# Patient Record
Sex: MALE | Race: WHITE | Employment: OTHER | URBAN - NONMETROPOLITAN AREA
[De-identification: names, ages, dates, MRNs, and addresses within clinical notes are randomized per-mention and may not be internally consistent; named-entity substitution may affect disease eponyms.]

---

## 2017-10-30 RX ORDER — MELOXICAM 7.5 MG/1
7.5 TABLET ORAL DAILY
COMMUNITY
End: 2017-10-31 | Stop reason: SDUPTHER

## 2017-10-31 ENCOUNTER — OFFICE VISIT (OUTPATIENT)
Dept: RHEUMATOLOGY | Age: 52
End: 2017-10-31
Payer: COMMERCIAL

## 2017-10-31 VITALS
HEIGHT: 67 IN | DIASTOLIC BLOOD PRESSURE: 87 MMHG | BODY MASS INDEX: 24.52 KG/M2 | HEART RATE: 99 BPM | RESPIRATION RATE: 16 BRPM | WEIGHT: 156.2 LBS | SYSTOLIC BLOOD PRESSURE: 177 MMHG

## 2017-10-31 DIAGNOSIS — R20.2 PARESTHESIA: ICD-10-CM

## 2017-10-31 DIAGNOSIS — Z51.81 MEDICATION MONITORING ENCOUNTER: ICD-10-CM

## 2017-10-31 DIAGNOSIS — M25.50 POLYARTHRALGIA: Primary | ICD-10-CM

## 2017-10-31 DIAGNOSIS — Z87.39 H/O RHEUMATOID ARTHRITIS: ICD-10-CM

## 2017-10-31 PROCEDURE — 99244 OFF/OP CNSLTJ NEW/EST MOD 40: CPT | Performed by: INTERNAL MEDICINE

## 2017-10-31 PROCEDURE — G8484 FLU IMMUNIZE NO ADMIN: HCPCS | Performed by: INTERNAL MEDICINE

## 2017-10-31 PROCEDURE — G8427 DOCREV CUR MEDS BY ELIG CLIN: HCPCS | Performed by: INTERNAL MEDICINE

## 2017-10-31 PROCEDURE — G8420 CALC BMI NORM PARAMETERS: HCPCS | Performed by: INTERNAL MEDICINE

## 2017-10-31 PROCEDURE — 3017F COLORECTAL CA SCREEN DOC REV: CPT | Performed by: INTERNAL MEDICINE

## 2017-10-31 RX ORDER — MELOXICAM 7.5 MG/1
7.5 TABLET ORAL DAILY
Qty: 30 TABLET | Refills: 2 | Status: SHIPPED | OUTPATIENT
Start: 2017-10-31 | End: 2017-12-04 | Stop reason: SDUPTHER

## 2017-10-31 ASSESSMENT — JOINT PAIN
TOTAL NUMBER OF TENDER JOINTS: 16
TOTAL NUMBER OF SWOLLEN JOINTS: 16

## 2017-10-31 ASSESSMENT — ENCOUNTER SYMPTOMS
GASTROINTESTINAL NEGATIVE: 1
BACK PAIN: 0
ORTHOPNEA: 1
EYES NEGATIVE: 1

## 2017-10-31 NOTE — Clinical Note
Please inform the patient that the hepatitis test were negative. The x-rays revealed some osteaorthitis of the fingers. The blood testing were consistent with rheumatoid arthritis. I have sent the prescription for methotrexate 15mg (six tablets) once weekly by mouth and folic acid 1mg daily by mouth  to the pharmacy. He will need to have blood tests done in 3-4 weeks after starting the new medications to watch for possible side effects.

## 2017-10-31 NOTE — PROGRESS NOTES
arthritis. Cancer screening: not up to date. No c-scope in the past.  Travel:denies   Exposure(s): denies     ROS:  Review of Systems   Constitutional: Positive for malaise/fatigue (intermittent). HENT: Negative. Eyes: Negative. Cardiovascular: Positive for orthopnea (improved w/ laying on side). Gastrointestinal: Negative. Genitourinary: Negative. Musculoskeletal: Negative for back pain, myalgias and neck pain. Skin: Negative. Neurological: Positive for tingling. Endo/Heme/Allergies: Bruises/bleeds easily. Psychiatric/Behavioral: Negative for depression, substance abuse and suicidal ideas. The patient has insomnia. PAST MEDICAL HISTORY  Past Medical History:   Diagnosis Date    Rheumatoid arthritis ([facility] [address]        SOCIAL HISTORY  Social History     Social History    Marital status: Single     Spouse name: N/A    Number of children: N/A    Years of education: N/A     Social History Main Topics    Smoking status: Not on file    Smokeless tobacco: Not on file    Alcohol use Not on file    Drug use: Unknown    Sexual activity: Not on file     Other Topics Concern    Not on file     Social History Narrative    No narrative on file       FAMILY HISTORY  No family history on file. SURGICAL HISTORY  Past Surgical History:   Procedure Laterality Date    ROTATOR CUFF REPAIR Right     shoulder       ALLERGIES  Allergies not on file    CURRENT MEDICATIONS  Current Outpatient Prescriptions   Medication Sig Dispense Refill    meloxicam (MOBIC) 7.5 MG tablet Take 7.5 mg by mouth daily       No current facility-administered medications for this visit. Objective:  BP (!) 177/87 (Site: Right Arm, Position: Sitting, Cuff Size: Medium Adult)   Pulse 99   Resp 16   Ht 5' 7\" (1.702 m)   Wt 156 lb 3.2 oz (70.9 kg)   BMI 24.46 kg/m²     General: No distress. Alert. Eyes: P ERRL. No sclera icterus. No conjunctival injection. ENT: No discharge. Pharynx clear.   Neck: Trachea Return in about 2 months (around 12/31/2017). Electronically signed by Amee Steen DO on 10/31/2017 at 10:47 AM      Thank you for allowing me to participate in the care of this patient. Please call if there are any questions. .    Addendum: 11/3/17:   - cmp mild elevation of t.bili, normal AST, ALT, Creatinine  - normal WBC, H/H, Plt.   - normal Z11 and folic acid   - (+) RF.   - XR chest with changes consistent with COPD  - XR bilaterla hand: soft tissue swelling, pieraticularly on the right at the IPP joints. 2. Arthritic changes of themetacarpophalgeal joints bilaterally and scattered trhough the DIP and IP joints. No definitive erosive changes are seen. 3. The swelling of the PIP joints can be an early sign of rheumatoid arthritis. - awaiting hepatitis panel prior to starting methotrexate or other DMARD       ADDNEDUM 11/7/17  XR cervical spine: 11/2/17 straightening consistent with muscle spasm noted. Addendum: 11/8/17:   - negative Hepatitis panel,   - (+) RF: IgG, IgM > 100, IgA > 100  - MAGDALENA w/ IFA: negative. - will start methotrexate 15mg p.o weekly and folic acid 1mg daily as discussed with patient during visit. - will need Cbc, CMP, ESR, CRP every 4 weeks with methotrexate initiation and titration.

## 2017-11-02 LAB
ALBUMIN SERPL-MCNC: 4.5 G/DL
ALP BLD-CCNC: 96 U/L
ALT SERPL-CCNC: 11 U/L
ANION GAP SERPL CALCULATED.3IONS-SCNC: 17 MMOL/L
AST SERPL-CCNC: 13 U/L
BASOPHILS ABSOLUTE: 100 /ΜL
BASOPHILS RELATIVE PERCENT: 0.8 %
BILIRUB SERPL-MCNC: 1.3 MG/DL (ref 0.1–1.4)
BUN BLDV-MCNC: 10 MG/DL
CALCIUM SERPL-MCNC: 9.2 MG/DL
CHLORIDE BLD-SCNC: 99 MMOL/L
CO2: 28 MMOL/L
CREAT SERPL-MCNC: 0.77 MG/DL
EOSINOPHILS ABSOLUTE: 100 /ΜL
EOSINOPHILS RELATIVE PERCENT: 1.7 %
FOLATE: 9.4
GFR CALCULATED: 112
GLUCOSE BLD-MCNC: 106 MG/DL
HCT VFR BLD CALC: 44.8 % (ref 41–53)
HEMOGLOBIN: 15.1 G/DL (ref 13.5–17.5)
LYMPHOCYTES ABSOLUTE: 1300 /ΜL
LYMPHOCYTES RELATIVE PERCENT: 20.7 %
MCH RBC QN AUTO: 34.1 PG
MCHC RBC AUTO-ENTMCNC: 33.6 G/DL
MCV RBC AUTO: 101.5 FL
MONOCYTES ABSOLUTE: 500 /ΜL
MONOCYTES RELATIVE PERCENT: 7.9 %
NEUTROPHILS ABSOLUTE: 4200 /ΜL
NEUTROPHILS RELATIVE PERCENT: 68.9 %
PDW BLD-RTO: 12.6 %
PLATELET # BLD: 249 K/ΜL
PMV BLD AUTO: NORMAL FL
POTASSIUM SERPL-SCNC: 4.6 MMOL/L
RBC # BLD: 4.42 10^6/ΜL
RHEUMATOID FACTOR: >10
SODIUM BLD-SCNC: 139 MMOL/L
TOTAL PROTEIN: 8
URIC ACID: 5.1
VITAMIN B-12: 366
WBC # BLD: 6.2 10^3/ML

## 2017-11-08 RX ORDER — FOLIC ACID 1 MG/1
1 TABLET ORAL DAILY
Qty: 30 TABLET | Refills: 3 | Status: SHIPPED | OUTPATIENT
Start: 2017-11-08 | End: 2017-12-12 | Stop reason: SDUPTHER

## 2017-11-09 ENCOUNTER — TELEPHONE (OUTPATIENT)
Dept: RHEUMATOLOGY | Age: 52
End: 2017-11-09

## 2017-11-09 NOTE — TELEPHONE ENCOUNTER
----- Message from Ramo Salazar DO sent at 11/8/2017  8:44 PM EST -----  Please inform the patient that the hepatitis test were negative. The x-rays revealed some osteaorthitis of the fingers. The blood testing were consistent with rheumatoid arthritis. I have sent the prescription for methotrexate 15mg (six tablets) once weekly by mouth and folic acid 1mg daily by mouth  to the pharmacy. He will need to have blood tests done in 3-4 weeks after starting the new medications to watch for possible side effects.

## 2017-12-04 RX ORDER — MELOXICAM 7.5 MG/1
7.5 TABLET ORAL DAILY
Qty: 30 TABLET | Refills: 2 | Status: SHIPPED | OUTPATIENT
Start: 2017-12-04 | End: 2018-03-05 | Stop reason: SDUPTHER

## 2017-12-04 RX ORDER — FOLIC ACID 1 MG/1
1 TABLET ORAL DAILY
Qty: 30 TABLET | Refills: 3 | Status: CANCELLED | OUTPATIENT
Start: 2017-12-04

## 2017-12-07 LAB
ALBUMIN SERPL-MCNC: 4.3 G/DL
ALP BLD-CCNC: 89 U/L
ALT SERPL-CCNC: 32 U/L
ANION GAP SERPL CALCULATED.3IONS-SCNC: 1.6 MMOL/L
AST SERPL-CCNC: 24 U/L
BASOPHILS ABSOLUTE: 0 /ΜL
BASOPHILS RELATIVE PERCENT: 0.4 %
BILIRUB SERPL-MCNC: 1.3 MG/DL (ref 0.1–1.4)
BUN BLDV-MCNC: 11 MG/DL
CALCIUM SERPL-MCNC: 8.9 MG/DL
CHLORIDE BLD-SCNC: 103 MMOL/L
CO2: 26 MMOL/L
CREAT SERPL-MCNC: 0.76 MG/DL
EOSINOPHILS ABSOLUTE: 100 /ΜL
EOSINOPHILS RELATIVE PERCENT: 2 %
GFR CALCULATED: 114
GLUCOSE BLD-MCNC: 110 MG/DL
HCT VFR BLD CALC: 42.2 % (ref 41–53)
HEMOGLOBIN: 14.5 G/DL (ref 13.5–17.5)
LYMPHOCYTES ABSOLUTE: 1100 /ΜL
LYMPHOCYTES RELATIVE PERCENT: 18.9 %
MCH RBC QN AUTO: 37.4 PG
MCHC RBC AUTO-ENTMCNC: 34.4 G/DL
MCV RBC AUTO: 108.8 FL
MONOCYTES ABSOLUTE: 400 /ΜL
MONOCYTES RELATIVE PERCENT: 7.4 %
NEUTROPHILS ABSOLUTE: 4200 /ΜL
NEUTROPHILS RELATIVE PERCENT: 71.3 %
PDW BLD-RTO: 16.9 %
PLATELET # BLD: 253 K/ΜL
PMV BLD AUTO: NORMAL FL
POTASSIUM SERPL-SCNC: 4.6 MMOL/L
RBC # BLD: 3.88 10^6/ΜL
SODIUM BLD-SCNC: 140 MMOL/L
TOTAL PROTEIN: 7
WBC # BLD: 5.9 10^3/ML

## 2017-12-12 ENCOUNTER — TELEPHONE (OUTPATIENT)
Dept: RHEUMATOLOGY | Age: 52
End: 2017-12-12

## 2017-12-12 RX ORDER — FOLIC ACID 1 MG/1
1 TABLET ORAL DAILY
Qty: 30 TABLET | Refills: 3 | Status: SHIPPED | OUTPATIENT
Start: 2017-12-12 | End: 2018-03-05 | Stop reason: SDUPTHER

## 2017-12-14 NOTE — TELEPHONE ENCOUNTER
Pt notified of providers recommendations and verbalized understanding.  Pt states methotrexate is helping

## 2018-01-22 ENCOUNTER — OFFICE VISIT (OUTPATIENT)
Dept: RHEUMATOLOGY | Age: 53
End: 2018-01-22
Payer: COMMERCIAL

## 2018-01-22 VITALS
RESPIRATION RATE: 16 BRPM | WEIGHT: 160.8 LBS | HEIGHT: 67 IN | HEART RATE: 97 BPM | BODY MASS INDEX: 25.24 KG/M2 | SYSTOLIC BLOOD PRESSURE: 152 MMHG | DIASTOLIC BLOOD PRESSURE: 91 MMHG

## 2018-01-22 DIAGNOSIS — M05.79 RHEUMATOID ARTHRITIS INVOLVING MULTIPLE SITES WITH POSITIVE RHEUMATOID FACTOR (HCC): Primary | ICD-10-CM

## 2018-01-22 DIAGNOSIS — F17.219 CIGARETTE NICOTINE DEPENDENCE WITH NICOTINE-INDUCED DISORDER: ICD-10-CM

## 2018-01-22 DIAGNOSIS — R20.2 PARESTHESIA: ICD-10-CM

## 2018-01-22 DIAGNOSIS — Z51.81 MEDICATION MONITORING ENCOUNTER: ICD-10-CM

## 2018-01-22 PROCEDURE — G8484 FLU IMMUNIZE NO ADMIN: HCPCS | Performed by: INTERNAL MEDICINE

## 2018-01-22 PROCEDURE — G8419 CALC BMI OUT NRM PARAM NOF/U: HCPCS | Performed by: INTERNAL MEDICINE

## 2018-01-22 PROCEDURE — G8427 DOCREV CUR MEDS BY ELIG CLIN: HCPCS | Performed by: INTERNAL MEDICINE

## 2018-01-22 PROCEDURE — 99406 BEHAV CHNG SMOKING 3-10 MIN: CPT | Performed by: INTERNAL MEDICINE

## 2018-01-22 PROCEDURE — 4004F PT TOBACCO SCREEN RCVD TLK: CPT | Performed by: INTERNAL MEDICINE

## 2018-01-22 PROCEDURE — 99214 OFFICE O/P EST MOD 30 MIN: CPT | Performed by: INTERNAL MEDICINE

## 2018-01-22 PROCEDURE — 3017F COLORECTAL CA SCREEN DOC REV: CPT | Performed by: INTERNAL MEDICINE

## 2018-01-22 RX ORDER — NICOTINE 21 MG/24HR
1 PATCH, TRANSDERMAL 24 HOURS TRANSDERMAL EVERY 24 HOURS
Qty: 30 PATCH | Refills: 3 | Status: SHIPPED | OUTPATIENT
Start: 2018-01-22 | End: 2019-05-14 | Stop reason: SDUPTHER

## 2018-01-22 ASSESSMENT — ENCOUNTER SYMPTOMS
BACK PAIN: 0
GASTROINTESTINAL NEGATIVE: 1
ORTHOPNEA: 1
EYES NEGATIVE: 1

## 2018-01-22 ASSESSMENT — JOINT PAIN
TOTAL NUMBER OF SWOLLEN JOINTS: 6
TOTAL NUMBER OF TENDER JOINTS: 16

## 2018-01-26 ENCOUNTER — TELEPHONE (OUTPATIENT)
Dept: RHEUMATOLOGY | Age: 53
End: 2018-01-26

## 2018-03-05 RX ORDER — FOLIC ACID 1 MG/1
1 TABLET ORAL DAILY
Qty: 30 TABLET | Refills: 3 | Status: SHIPPED | OUTPATIENT
Start: 2018-03-05 | End: 2018-03-05 | Stop reason: SDUPTHER

## 2018-03-05 RX ORDER — FOLIC ACID 1 MG/1
1 TABLET ORAL DAILY
Qty: 90 TABLET | Refills: 3 | Status: SHIPPED | OUTPATIENT
Start: 2018-03-05 | End: 2018-03-29 | Stop reason: SDUPTHER

## 2018-03-05 RX ORDER — MELOXICAM 7.5 MG/1
7.5 TABLET ORAL DAILY
Qty: 30 TABLET | Refills: 2 | Status: SHIPPED | OUTPATIENT
Start: 2018-03-05 | End: 2018-07-26 | Stop reason: SDUPTHER

## 2018-03-06 ENCOUNTER — TELEPHONE (OUTPATIENT)
Dept: RHEUMATOLOGY | Age: 53
End: 2018-03-06

## 2018-03-26 ENCOUNTER — OFFICE VISIT (OUTPATIENT)
Dept: RHEUMATOLOGY | Age: 53
End: 2018-03-26
Payer: COMMERCIAL

## 2018-03-26 VITALS
WEIGHT: 163.4 LBS | SYSTOLIC BLOOD PRESSURE: 155 MMHG | HEIGHT: 67 IN | RESPIRATION RATE: 18 BRPM | BODY MASS INDEX: 25.65 KG/M2 | HEART RATE: 97 BPM | DIASTOLIC BLOOD PRESSURE: 88 MMHG

## 2018-03-26 DIAGNOSIS — M05.79 RHEUMATOID ARTHRITIS INVOLVING MULTIPLE SITES WITH POSITIVE RHEUMATOID FACTOR (HCC): Primary | ICD-10-CM

## 2018-03-26 DIAGNOSIS — Z51.81 MEDICATION MONITORING ENCOUNTER: ICD-10-CM

## 2018-03-26 DIAGNOSIS — R20.2 PARESTHESIA: ICD-10-CM

## 2018-03-26 DIAGNOSIS — F17.219 CIGARETTE NICOTINE DEPENDENCE WITH NICOTINE-INDUCED DISORDER: ICD-10-CM

## 2018-03-26 DIAGNOSIS — M06.30 RHEUMATOID NODULOSIS (HCC): ICD-10-CM

## 2018-03-26 PROCEDURE — 99214 OFFICE O/P EST MOD 30 MIN: CPT | Performed by: INTERNAL MEDICINE

## 2018-03-26 PROCEDURE — G8484 FLU IMMUNIZE NO ADMIN: HCPCS | Performed by: INTERNAL MEDICINE

## 2018-03-26 PROCEDURE — G8419 CALC BMI OUT NRM PARAM NOF/U: HCPCS | Performed by: INTERNAL MEDICINE

## 2018-03-26 PROCEDURE — 4004F PT TOBACCO SCREEN RCVD TLK: CPT | Performed by: INTERNAL MEDICINE

## 2018-03-26 PROCEDURE — G8427 DOCREV CUR MEDS BY ELIG CLIN: HCPCS | Performed by: INTERNAL MEDICINE

## 2018-03-26 PROCEDURE — 3017F COLORECTAL CA SCREEN DOC REV: CPT | Performed by: INTERNAL MEDICINE

## 2018-03-26 ASSESSMENT — ENCOUNTER SYMPTOMS
BACK PAIN: 0
ORTHOPNEA: 1
EYES NEGATIVE: 1
GASTROINTESTINAL NEGATIVE: 1

## 2018-03-26 ASSESSMENT — JOINT PAIN
TOTAL NUMBER OF SWOLLEN JOINTS: 7
TOTAL NUMBER OF TENDER JOINTS: 8

## 2018-03-26 NOTE — PROGRESS NOTES
Smoking status: Current Every Day Smoker     Packs/day: 1.00     Years: 35.00     Types: Cigarettes    Smokeless tobacco: Never Used    Alcohol use No    Drug use: No    Sexual activity: Not Asked     Other Topics Concern    None     Social History Narrative    None       FAMILY HISTORY  Family History   Problem Relation Age of Onset    High Blood Pressure Mother     Kidney Disease Father        SURGICAL HISTORY  Past Surgical History:   Procedure Laterality Date    ROTATOR CUFF REPAIR Right     shoulder       ALLERGIES  No Known Allergies    CURRENT MEDICATIONS  Current Outpatient Prescriptions   Medication Sig Dispense Refill    meloxicam (MOBIC) 7.5 MG tablet Take 1 tablet by mouth daily 30 tablet 2    folic acid (FOLVITE) 1 MG tablet Take 1 tablet by mouth daily 90 tablet 3    methotrexate (RHEUMATREX) 2.5 MG chemo tablet Take 10 tablets by mouth every 7 days Take 5 tablets in the morning and 5 tablets in the evening 40 tablet 0    nicotine (NICODERM CQ) 21 MG/24HR Place 1 patch onto the skin every 24 hours 30 patch 3     No current facility-administered medications for this visit. Objective:  BP (!) 155/88   Pulse 97   Resp 18   Ht 5' 7.01\" (1.702 m)   Wt 163 lb 6.4 oz (74.1 kg)   BMI 25.59 kg/m²     General: No distress. Alert. Eyes: P ERRL. No sclera icterus. No conjunctival injection. ENT: No discharge. Pharynx clear. Neck: Trachea midline. Normal thyroid. Resp: No accessory muscle use. No crackles. No wheezing. No rhonchi. No dullness on percussion. CV: Regular rate. Regular rhythm. No mumur or rub. No edema. M/S:   Upper extremities:  Muscle strength 5/5, FROM, No Synovitis   Shoulder: intact ROM. Negative bilateral hawking, bhavna, speed, infraspinatus, scarf. Elbows: no swelling. Nodules along the olecranon process, swelling of the Left olecranon, nodules along the mid-forearm  Wrist: non-tender, no synovitis.    Fingers: synovitis/swelling/tender  left MCP 2-4, left

## 2018-03-29 ENCOUNTER — TELEPHONE (OUTPATIENT)
Dept: RHEUMATOLOGY | Age: 53
End: 2018-03-29

## 2018-03-29 RX ORDER — FOLIC ACID 1 MG/1
1 TABLET ORAL DAILY
Qty: 90 TABLET | Refills: 3 | Status: SHIPPED | OUTPATIENT
Start: 2018-03-29 | End: 2018-07-24 | Stop reason: SDUPTHER

## 2018-03-29 NOTE — TELEPHONE ENCOUNTER
----- Message from Navjot Sanders DO sent at 3/28/2018 12:33 PM EDT -----  The blood testing revealed normal inflammatory markers (Erythrocyte Sedimentation Rate (ESR) and C-reactive protein). His white blood cell count was found to be mildly elevated on the recent evaluation.

## 2018-05-04 ENCOUNTER — TELEPHONE (OUTPATIENT)
Dept: RHEUMATOLOGY | Age: 53
End: 2018-05-04

## 2018-05-09 ENCOUNTER — TELEPHONE (OUTPATIENT)
Dept: RHEUMATOLOGY | Age: 53
End: 2018-05-09

## 2018-05-10 ENCOUNTER — TELEPHONE (OUTPATIENT)
Dept: RHEUMATOLOGY | Age: 53
End: 2018-05-10

## 2018-05-10 LAB
A/G RATIO: 1.9 (ref 1.5–2.5)
A/G RATIO: 2.1 (ref 1.5–2.5)
A/G RATIO: 2.2 (ref 1.5–2.5)
ABSOLUTE BASO #: 0 /CMM (ref 0–200)
ABSOLUTE BASO #: 0 /CMM (ref 0–200)
ABSOLUTE BASO #: 100 /CMM (ref 0–200)
ABSOLUTE EOS #: 100 /CMM (ref 0–500)
ABSOLUTE EOS #: 200 /CMM (ref 0–500)
ABSOLUTE EOS #: 200 /CMM (ref 0–500)
ABSOLUTE LYMPH #: 1300 /CMM (ref 1000–4800)
ABSOLUTE LYMPH #: 1300 /CMM (ref 1000–4800)
ABSOLUTE LYMPH #: 1700 /CMM (ref 1000–4800)
ABSOLUTE MONO #: 500 /CMM (ref 0–800)
ABSOLUTE MONO #: 500 /CMM (ref 0–800)
ABSOLUTE MONO #: 700 /CMM (ref 0–800)
ABSOLUTE NEUT #: 4400 /CMM (ref 1800–7700)
ABSOLUTE NEUT #: 4500 /CMM (ref 1800–7700)
ABSOLUTE NEUT #: 9900 /CMM (ref 1800–7700)
ALBUMIN SERPL-MCNC: 4.5 GM/DL (ref 3.5–5)
ALBUMIN SERPL-MCNC: 4.6 GM/DL (ref 3.5–5)
ALBUMIN SERPL-MCNC: 4.8 GM/DL (ref 3.5–5)
ALP BLD-CCNC: 81 IU/L (ref 41–137)
ALP BLD-CCNC: 84 IU/L (ref 41–137)
ALP BLD-CCNC: 92 IU/L (ref 41–137)
ALT SERPL-CCNC: 15 IU/L (ref 10–40)
ALT SERPL-CCNC: 19 IU/L (ref 10–40)
ALT SERPL-CCNC: 28 IU/L (ref 10–40)
ANION GAP SERPL CALCULATED.3IONS-SCNC: 4 MMOL/L (ref 4–12)
ANION GAP SERPL CALCULATED.3IONS-SCNC: 4 MMOL/L (ref 4–12)
ANION GAP SERPL CALCULATED.3IONS-SCNC: 6 MMOL/L (ref 4–12)
AST SERPL-CCNC: 16 IU/L (ref 15–41)
AST SERPL-CCNC: 17 IU/L (ref 15–41)
AST SERPL-CCNC: 18 IU/L (ref 15–41)
BASOPHILS RELATIVE PERCENT: 0.4 % (ref 0–2)
BASOPHILS RELATIVE PERCENT: 0.7 % (ref 0–2)
BASOPHILS RELATIVE PERCENT: 0.9 % (ref 0–2)
BILIRUB SERPL-MCNC: 1.3 MG/DL (ref 0.2–1)
BILIRUB SERPL-MCNC: 1.5 MG/DL (ref 0.2–1)
BILIRUB SERPL-MCNC: 1.7 MG/DL (ref 0.2–1)
BUN BLDV-MCNC: 15 MG/DL (ref 7–20)
BUN BLDV-MCNC: 8 MG/DL (ref 7–20)
BUN BLDV-MCNC: 9 MG/DL (ref 7–20)
C-REACTIVE PROTEIN: 0.6 MG/DL
C-REACTIVE PROTEIN: 0.7 MG/DL
C-REACTIVE PROTEIN: 0.9 MG/DL
CALCIUM SERPL-MCNC: 9 MG/DL (ref 8.8–10.5)
CALCIUM SERPL-MCNC: 9.1 MG/DL (ref 8.8–10.5)
CALCIUM SERPL-MCNC: 9.2 MG/DL (ref 8.8–10.5)
CHLORIDE BLD-SCNC: 107 MEQ/L (ref 101–111)
CHLORIDE BLD-SCNC: 108 MEQ/L (ref 101–111)
CHLORIDE BLD-SCNC: 108 MEQ/L (ref 101–111)
CO2: 25 MEQ/L (ref 21–32)
CO2: 26 MEQ/L (ref 21–32)
CO2: 27 MEQ/L (ref 21–32)
CREAT SERPL-MCNC: 0.85 MG/DL (ref 0.7–1.3)
CREAT SERPL-MCNC: 0.93 MG/DL (ref 0.7–1.3)
CREAT SERPL-MCNC: 0.94 MG/DL (ref 0.7–1.3)
CREATININE CLEARANCE: >60
EOSINOPHILS RELATIVE PERCENT: 1.6 % (ref 0–6)
EOSINOPHILS RELATIVE PERCENT: 2.2 % (ref 0–6)
EOSINOPHILS RELATIVE PERCENT: 2.6 % (ref 0–6)
GLUCOSE: 101 MG/DL (ref 70–110)
GLUCOSE: 107 MG/DL (ref 70–110)
GLUCOSE: 96 MG/DL (ref 70–110)
HCT VFR BLD CALC: 40.9 % (ref 40–49)
HCT VFR BLD CALC: 41.8 % (ref 40–49)
HCT VFR BLD CALC: 43.9 % (ref 40–49)
HEMOGLOBIN: 14.2 GM/DL (ref 13.5–16.5)
HEMOGLOBIN: 14.3 GM/DL (ref 13.5–16.5)
HEMOGLOBIN: 15 GM/DL (ref 13.5–16.5)
LYMPHOCYTES RELATIVE PERCENT: 13.5 % (ref 15–45)
LYMPHOCYTES RELATIVE PERCENT: 20.2 % (ref 15–45)
LYMPHOCYTES RELATIVE PERCENT: 20.3 % (ref 15–45)
MACROCYTOSIS: ABNORMAL
MACROCYTOSIS: ABNORMAL
MCH RBC QN AUTO: 35.5 PG (ref 27.5–33)
MCH RBC QN AUTO: 37.6 PG (ref 27.5–33)
MCH RBC QN AUTO: 37.6 PG (ref 27.5–33)
MCHC RBC AUTO-ENTMCNC: 34.1 GM/DL (ref 33–36)
MCHC RBC AUTO-ENTMCNC: 34.1 GM/DL (ref 33–36)
MCHC RBC AUTO-ENTMCNC: 35 GM/DL (ref 33–36)
MCV RBC AUTO: 104.2 CU MIC (ref 80–97)
MCV RBC AUTO: 107.4 CU MIC (ref 80–97)
MCV RBC AUTO: 110.3 CU MIC (ref 80–97)
MONOCYTES RELATIVE PERCENT: 5.6 % (ref 2–10)
MONOCYTES RELATIVE PERCENT: 7.3 % (ref 2–10)
MONOCYTES RELATIVE PERCENT: 7.9 % (ref 2–10)
NEUTROPHILS RELATIVE PERCENT: 68.9 % (ref 40–70)
NEUTROPHILS RELATIVE PERCENT: 69 % (ref 40–70)
NEUTROPHILS RELATIVE PERCENT: 78.9 % (ref 40–70)
NUCLEATED RBCS: 0 /100 WBC
NUCLEATED RBCS: 0.1 /100 WBC
NUCLEATED RBCS: 0.2 /100 WBC
PDW BLD-RTO: 13.6 % (ref 12–16)
PDW BLD-RTO: 13.9 % (ref 12–16)
PDW BLD-RTO: 13.9 % (ref 12–16)
PLATELET # BLD: 267 TH/CMM (ref 150–400)
PLATELET # BLD: 273 TH/CMM (ref 150–400)
PLATELET # BLD: 298 TH/CMM (ref 150–400)
POTASSIUM SERPL-SCNC: 4.4 MEQ/L (ref 3.6–5)
POTASSIUM SERPL-SCNC: 4.5 MEQ/L (ref 3.6–5)
POTASSIUM SERPL-SCNC: 4.5 MEQ/L (ref 3.6–5)
RBC # BLD: 3.81 MIL/CMM (ref 4.5–6)
RBC # BLD: 3.98 MIL/CMM (ref 4.5–6)
RBC # BLD: 4.01 MIL/CMM (ref 4.5–6)
SEDIMENTATION RATE, ERYTHROCYTE: 4 MM/HR
SEDIMENTATION RATE, ERYTHROCYTE: 4 MM/HR
SEDIMENTATION RATE, ERYTHROCYTE: 5 MM/HR
SODIUM BLD-SCNC: 138 MEQ/L (ref 135–145)
SODIUM BLD-SCNC: 138 MEQ/L (ref 135–145)
SODIUM BLD-SCNC: 139 MEQ/L (ref 135–145)
TOTAL PROTEIN: 6.8 G/DL (ref 6.2–8)
TOTAL PROTEIN: 6.9 G/DL (ref 6.2–8)
TOTAL PROTEIN: 7 G/DL (ref 6.2–8)
WBC # BLD: 12.5 TH/CMM (ref 4.4–10.5)
WBC # BLD: 6.4 TH/CMM (ref 4.4–10.5)
WBC # BLD: 6.6 TH/CMM (ref 4.4–10.5)

## 2018-05-11 LAB
A/G RATIO: 2.6 (ref 1.5–2.5)
ABSOLUTE BASO #: 0 /CMM (ref 0–200)
ABSOLUTE EOS #: 100 /CMM (ref 0–500)
ABSOLUTE LYMPH #: 1300 /CMM (ref 1000–4800)
ABSOLUTE MONO #: 600 /CMM (ref 0–800)
ABSOLUTE NEUT #: 4000 /CMM (ref 1800–7700)
ALBUMIN SERPL-MCNC: 4.6 GM/DL (ref 3.5–5)
ALP BLD-CCNC: 75 IU/L (ref 41–137)
ALT SERPL-CCNC: 14 IU/L (ref 10–40)
ANION GAP SERPL CALCULATED.3IONS-SCNC: 5 MMOL/L (ref 4–12)
AST SERPL-CCNC: 15 IU/L (ref 15–41)
BASOPHILS RELATIVE PERCENT: 0.6 % (ref 0–2)
BILIRUB SERPL-MCNC: 1.2 MG/DL (ref 0.2–1)
BUN BLDV-MCNC: 11 MG/DL (ref 7–20)
C-REACTIVE PROTEIN: 0.8 MG/DL
CALCIUM SERPL-MCNC: 8.7 MG/DL (ref 8.8–10.5)
CHLORIDE BLD-SCNC: 110 MEQ/L (ref 101–111)
CO2: 25 MEQ/L (ref 21–32)
CREAT SERPL-MCNC: 0.81 MG/DL (ref 0.7–1.3)
CREATININE CLEARANCE: >60
EOSINOPHILS RELATIVE PERCENT: 1.7 % (ref 0–6)
GLUCOSE: 100 MG/DL (ref 70–110)
HCT VFR BLD CALC: 40.9 % (ref 40–49)
HEMOGLOBIN: 13.3 GM/DL (ref 13.5–16.5)
LYMPHOCYTES RELATIVE PERCENT: 22 % (ref 15–45)
MACROCYTOSIS: ABNORMAL
MCH RBC QN AUTO: 36.8 PG (ref 27.5–33)
MCHC RBC AUTO-ENTMCNC: 32.6 GM/DL (ref 33–36)
MCV RBC AUTO: 113 CU MIC (ref 80–97)
MONOCYTES RELATIVE PERCENT: 9.6 % (ref 2–10)
NEUTROPHILS RELATIVE PERCENT: 66.1 % (ref 40–70)
NUCLEATED RBCS: 0.1 /100 WBC
PDW BLD-RTO: 13.9 % (ref 12–16)
PLATELET # BLD: 269 TH/CMM (ref 150–400)
POTASSIUM SERPL-SCNC: 3.9 MEQ/L (ref 3.6–5)
RBC # BLD: 3.62 MIL/CMM (ref 4.5–6)
SEDIMENTATION RATE, ERYTHROCYTE: 4 MM/HR
SODIUM BLD-SCNC: 140 MEQ/L (ref 135–145)
TOTAL PROTEIN: 6.4 G/DL (ref 6.2–8)
WBC # BLD: 6.1 TH/CMM (ref 4.4–10.5)

## 2018-05-13 RX ORDER — SULFASALAZINE 500 MG/1
TABLET ORAL
Qty: 120 TABLET | Refills: 0 | Status: SHIPPED | OUTPATIENT
Start: 2018-05-13 | End: 2018-05-29 | Stop reason: SINTOL

## 2018-05-14 ENCOUNTER — TELEPHONE (OUTPATIENT)
Dept: RHEUMATOLOGY | Age: 53
End: 2018-05-14

## 2018-05-17 ENCOUNTER — TELEPHONE (OUTPATIENT)
Dept: RHEUMATOLOGY | Age: 53
End: 2018-05-17

## 2018-05-29 ENCOUNTER — OFFICE VISIT (OUTPATIENT)
Dept: RHEUMATOLOGY | Age: 53
End: 2018-05-29
Payer: COMMERCIAL

## 2018-05-29 VITALS
BODY MASS INDEX: 24.39 KG/M2 | WEIGHT: 155.4 LBS | OXYGEN SATURATION: 96 % | HEART RATE: 76 BPM | HEIGHT: 67 IN | SYSTOLIC BLOOD PRESSURE: 146 MMHG | DIASTOLIC BLOOD PRESSURE: 85 MMHG

## 2018-05-29 DIAGNOSIS — M19.041 OSTEOARTHRITIS OF BOTH HANDS, UNSPECIFIED OSTEOARTHRITIS TYPE: ICD-10-CM

## 2018-05-29 DIAGNOSIS — M06.30 RHEUMATOID NODULOSIS (HCC): ICD-10-CM

## 2018-05-29 DIAGNOSIS — Z51.81 MEDICATION MONITORING ENCOUNTER: ICD-10-CM

## 2018-05-29 DIAGNOSIS — F17.219 CIGARETTE NICOTINE DEPENDENCE WITH NICOTINE-INDUCED DISORDER: ICD-10-CM

## 2018-05-29 DIAGNOSIS — M19.042 OSTEOARTHRITIS OF BOTH HANDS, UNSPECIFIED OSTEOARTHRITIS TYPE: ICD-10-CM

## 2018-05-29 DIAGNOSIS — M05.79 RHEUMATOID ARTHRITIS INVOLVING MULTIPLE SITES WITH POSITIVE RHEUMATOID FACTOR (HCC): Primary | ICD-10-CM

## 2018-05-29 DIAGNOSIS — R20.2 PARESTHESIA: ICD-10-CM

## 2018-05-29 PROCEDURE — G8420 CALC BMI NORM PARAMETERS: HCPCS | Performed by: INTERNAL MEDICINE

## 2018-05-29 PROCEDURE — 4004F PT TOBACCO SCREEN RCVD TLK: CPT | Performed by: INTERNAL MEDICINE

## 2018-05-29 PROCEDURE — 3017F COLORECTAL CA SCREEN DOC REV: CPT | Performed by: INTERNAL MEDICINE

## 2018-05-29 PROCEDURE — G8427 DOCREV CUR MEDS BY ELIG CLIN: HCPCS | Performed by: INTERNAL MEDICINE

## 2018-05-29 PROCEDURE — 99214 OFFICE O/P EST MOD 30 MIN: CPT | Performed by: INTERNAL MEDICINE

## 2018-05-29 RX ORDER — LEFLUNOMIDE 10 MG/1
10 TABLET ORAL DAILY
Qty: 30 TABLET | Refills: 0 | Status: SHIPPED | OUTPATIENT
Start: 2018-05-29 | End: 2018-06-25 | Stop reason: SDUPTHER

## 2018-05-29 ASSESSMENT — ENCOUNTER SYMPTOMS
EYES NEGATIVE: 1
ORTHOPNEA: 1
GASTROINTESTINAL NEGATIVE: 1
BACK PAIN: 0

## 2018-05-29 ASSESSMENT — JOINT PAIN
TOTAL NUMBER OF SWOLLEN JOINTS: 6
TOTAL NUMBER OF TENDER JOINTS: 8

## 2018-06-05 LAB
A/G RATIO: 1.4 (ref 1.5–2.5)
ABSOLUTE BASO #: 0 /CMM (ref 0–200)
ABSOLUTE EOS #: 200 /CMM (ref 0–500)
ABSOLUTE LYMPH #: 1300 /CMM (ref 1000–4800)
ABSOLUTE MONO #: 400 /CMM (ref 0–800)
ABSOLUTE NEUT #: 5700 /CMM (ref 1800–7700)
ALBUMIN SERPL-MCNC: 4.1 GM/DL (ref 3.5–5)
ALP BLD-CCNC: 79 IU/L (ref 41–137)
ALT SERPL-CCNC: 19 IU/L (ref 10–40)
ANION GAP SERPL CALCULATED.3IONS-SCNC: 7 MMOL/L (ref 4–12)
AST SERPL-CCNC: 17 IU/L (ref 15–41)
BASOPHILS RELATIVE PERCENT: 0.6 % (ref 0–2)
BILIRUB SERPL-MCNC: 1.9 MG/DL (ref 0.2–1)
BUN BLDV-MCNC: 10 MG/DL (ref 7–20)
C-REACTIVE PROTEIN: 0.7 MG/DL
CALCIUM SERPL-MCNC: 8.8 MG/DL (ref 8.8–10.5)
CHLORIDE BLD-SCNC: 108 MEQ/L (ref 101–111)
CO2: 25 MEQ/L (ref 21–32)
CREAT SERPL-MCNC: 0.83 MG/DL (ref 0.7–1.3)
CREATININE CLEARANCE: >60
EOSINOPHILS RELATIVE PERCENT: 2.1 % (ref 0–6)
GLUCOSE: 98 MG/DL (ref 70–110)
HCT VFR BLD CALC: 42.1 % (ref 40–49)
HEMOGLOBIN: 13.9 GM/DL (ref 13.5–16.5)
LYMPHOCYTES RELATIVE PERCENT: 17.5 % (ref 15–45)
MACROCYTOSIS: ABNORMAL
MCH RBC QN AUTO: 35 PG (ref 27.5–33)
MCHC RBC AUTO-ENTMCNC: 33 GM/DL (ref 33–36)
MCV RBC AUTO: 106.2 CU MIC (ref 80–97)
MONOCYTES RELATIVE PERCENT: 5.7 % (ref 2–10)
NEUTROPHILS RELATIVE PERCENT: 74.1 % (ref 40–70)
NUCLEATED RBCS: 0 /100 WBC
PDW BLD-RTO: 13 % (ref 12–16)
PLATELET # BLD: 277 TH/CMM (ref 150–400)
POTASSIUM SERPL-SCNC: 4.3 MEQ/L (ref 3.6–5)
RBC # BLD: 3.96 MIL/CMM (ref 4.5–6)
SEDIMENTATION RATE, ERYTHROCYTE: 3 MM/HR
SODIUM BLD-SCNC: 140 MEQ/L (ref 135–145)
TOTAL PROTEIN: 7 G/DL (ref 6.2–8)
WBC # BLD: 7.7 TH/CMM (ref 4.4–10.5)

## 2018-06-25 LAB
A/G RATIO: 1.9 (ref 1.5–2.5)
ABSOLUTE BASO #: 0 /CMM (ref 0–200)
ABSOLUTE EOS #: 100 /CMM (ref 0–500)
ABSOLUTE LYMPH #: 1100 /CMM (ref 1000–4800)
ABSOLUTE MONO #: 400 /CMM (ref 0–800)
ABSOLUTE NEUT #: 3500 /CMM (ref 1800–7700)
ALBUMIN SERPL-MCNC: 4.3 GM/DL (ref 3.5–5)
ALP BLD-CCNC: 77 IU/L (ref 41–137)
ALT SERPL-CCNC: 26 IU/L (ref 10–40)
ANION GAP SERPL CALCULATED.3IONS-SCNC: 9 MMOL/L (ref 4–12)
AST SERPL-CCNC: 24 IU/L (ref 15–41)
BASOPHILS RELATIVE PERCENT: 0.9 % (ref 0–2)
BILIRUB SERPL-MCNC: 1.6 MG/DL (ref 0.2–1)
BUN BLDV-MCNC: 16 MG/DL (ref 7–20)
C-REACTIVE PROTEIN: 0.7 MG/DL
CALCIUM SERPL-MCNC: 8.5 MG/DL (ref 8.8–10.5)
CHLORIDE BLD-SCNC: 107 MEQ/L (ref 101–111)
CO2: 23 MEQ/L (ref 21–32)
CREAT SERPL-MCNC: 0.85 MG/DL (ref 0.7–1.3)
CREATININE CLEARANCE: >60
EOSINOPHILS RELATIVE PERCENT: 2.9 % (ref 0–6)
GLUCOSE: 96 MG/DL (ref 70–110)
HCT VFR BLD CALC: 39.9 % (ref 40–49)
HEMOGLOBIN: 13.3 GM/DL (ref 13.5–16.5)
LYMPHOCYTES RELATIVE PERCENT: 21.9 % (ref 15–45)
MACROCYTOSIS: ABNORMAL
MCH RBC QN AUTO: 37 PG (ref 27.5–33)
MCHC RBC AUTO-ENTMCNC: 33.4 GM/DL (ref 33–36)
MCV RBC AUTO: 110.9 CU MIC (ref 80–97)
MONOCYTES RELATIVE PERCENT: 6.8 % (ref 2–10)
NEUTROPHILS RELATIVE PERCENT: 67.5 % (ref 40–70)
NUCLEATED RBCS: 0 /100 WBC
PDW BLD-RTO: 13.6 % (ref 12–16)
PLATELET # BLD: 265 TH/CMM (ref 150–400)
POTASSIUM SERPL-SCNC: 4.7 MEQ/L (ref 3.6–5)
RBC # BLD: 3.6 MIL/CMM (ref 4.5–6)
SEDIMENTATION RATE, ERYTHROCYTE: 5 MM/HR
SODIUM BLD-SCNC: 139 MEQ/L (ref 135–145)
TOTAL PROTEIN: 6.6 G/DL (ref 6.2–8)
WBC # BLD: 5.2 TH/CMM (ref 4.4–10.5)

## 2018-06-25 RX ORDER — LEFLUNOMIDE 10 MG/1
10 TABLET ORAL DAILY
Qty: 30 TABLET | Refills: 0 | Status: SHIPPED | OUTPATIENT
Start: 2018-06-25 | End: 2018-07-24 | Stop reason: SDUPTHER

## 2018-06-27 ENCOUNTER — TELEPHONE (OUTPATIENT)
Dept: RHEUMATOLOGY | Age: 53
End: 2018-06-27

## 2018-07-24 LAB
A/G RATIO: 1.7 (ref 1.5–2.5)
ABSOLUTE BASO #: 0 /CMM (ref 0–200)
ABSOLUTE EOS #: 100 /CMM (ref 0–500)
ABSOLUTE LYMPH #: 1000 /CMM (ref 1000–4800)
ABSOLUTE MONO #: 300 /CMM (ref 0–800)
ABSOLUTE NEUT #: 3000 /CMM (ref 1800–7700)
ALBUMIN SERPL-MCNC: 4.4 GM/DL (ref 3.5–5)
ALP BLD-CCNC: 82 IU/L (ref 41–137)
ALT SERPL-CCNC: 44 IU/L (ref 10–40)
ANION GAP SERPL CALCULATED.3IONS-SCNC: 5 MMOL/L (ref 4–12)
AST SERPL-CCNC: 34 IU/L (ref 15–41)
BASOPHILS RELATIVE PERCENT: 1 % (ref 0–2)
BILIRUB SERPL-MCNC: 1.4 MG/DL (ref 0.2–1)
BUN BLDV-MCNC: 10 MG/DL (ref 7–20)
C-REACTIVE PROTEIN: 0.5 MG/DL
CALCIUM SERPL-MCNC: 8.7 MG/DL (ref 8.8–10.5)
CHLORIDE BLD-SCNC: 108 MEQ/L (ref 101–111)
CO2: 25 MEQ/L (ref 21–32)
CREAT SERPL-MCNC: 0.74 MG/DL (ref 0.7–1.3)
CREATININE CLEARANCE: >60
EOSINOPHILS RELATIVE PERCENT: 3.2 % (ref 0–6)
GLUCOSE: 99 MG/DL (ref 70–110)
HCT VFR BLD CALC: 41.4 % (ref 40–49)
HEMOGLOBIN: 13.7 GM/DL (ref 13.5–16.5)
LYMPHOCYTES RELATIVE PERCENT: 22.9 % (ref 15–45)
MACROCYTOSIS: ABNORMAL
MCH RBC QN AUTO: 37.5 PG (ref 27.5–33)
MCHC RBC AUTO-ENTMCNC: 33.1 GM/DL (ref 33–36)
MCV RBC AUTO: 113.3 CU MIC (ref 80–97)
MONOCYTES RELATIVE PERCENT: 6.6 % (ref 2–10)
NEUTROPHILS RELATIVE PERCENT: 66.3 % (ref 40–70)
NUCLEATED RBCS: 0 /100 WBC
PDW BLD-RTO: 13.9 % (ref 12–16)
PLATELET # BLD: 262 TH/CMM (ref 150–400)
POTASSIUM SERPL-SCNC: 4.7 MEQ/L (ref 3.6–5)
RBC # BLD: 3.65 MIL/CMM (ref 4.5–6)
SEDIMENTATION RATE, ERYTHROCYTE: 3 MM/HR
SODIUM BLD-SCNC: 138 MEQ/L (ref 135–145)
TOTAL PROTEIN: 7 G/DL (ref 6.2–8)
WBC # BLD: 4.5 TH/CMM (ref 4.4–10.5)

## 2018-07-24 RX ORDER — LEFLUNOMIDE 10 MG/1
10 TABLET ORAL DAILY
Qty: 30 TABLET | Refills: 0 | Status: SHIPPED | OUTPATIENT
Start: 2018-07-24 | End: 2018-07-26 | Stop reason: SDUPTHER

## 2018-07-24 RX ORDER — FOLIC ACID 1 MG/1
2 TABLET ORAL DAILY
Qty: 180 TABLET | Refills: 1 | Status: SHIPPED | OUTPATIENT
Start: 2018-07-24 | End: 2018-08-20 | Stop reason: SDUPTHER

## 2018-07-24 NOTE — PROGRESS NOTES
Labs reviewed. LFTs elevated with ALT 44.     Rheumatoid arthritis:  continue methotrexate 25 mg weekly  Increase folic acid to 2 mg daily given recent LFT elevation  Continue Arava 10 mg daily

## 2018-07-25 ENCOUNTER — TELEPHONE (OUTPATIENT)
Dept: RHEUMATOLOGY | Age: 53
End: 2018-07-25

## 2018-07-25 NOTE — TELEPHONE ENCOUNTER
Spoke with patient about results, medications and repeated lab work in 3-4 works. Patient reported understanding and stated he will be in office angela for his appt and if need he will ask further questions with . Thank you.

## 2018-07-25 NOTE — TELEPHONE ENCOUNTER
----- Message from Dimitry Pride DO sent at 7/24/2018  6:27 PM EDT -----  Please call pt with lab results. ALT mildly elevation. Continue methotrexate 25mg weekly , arava 10mg daily   - increase folic acid to 2mg daily  - repeat labs in 3-4 weeks.

## 2018-07-27 RX ORDER — MELOXICAM 7.5 MG/1
7.5 TABLET ORAL DAILY
Qty: 30 TABLET | Refills: 2 | Status: SHIPPED | OUTPATIENT
Start: 2018-07-27 | End: 2018-08-21 | Stop reason: SDUPTHER

## 2018-07-27 RX ORDER — LEFLUNOMIDE 10 MG/1
10 TABLET ORAL DAILY
Qty: 30 TABLET | Refills: 0 | Status: SHIPPED | OUTPATIENT
Start: 2018-07-27 | End: 2018-08-20 | Stop reason: SDUPTHER

## 2018-08-17 LAB
A/G RATIO: 1.8 (ref 1.5–2.5)
ABSOLUTE BASO #: 0 /CMM (ref 0–200)
ABSOLUTE EOS #: 100 /CMM (ref 0–500)
ABSOLUTE LYMPH #: 800 /CMM (ref 1000–4800)
ABSOLUTE MONO #: 500 /CMM (ref 0–800)
ABSOLUTE NEUT #: 4600 /CMM (ref 1800–7700)
ALBUMIN SERPL-MCNC: 4.2 GM/DL (ref 3.5–5)
ALP BLD-CCNC: 81 IU/L (ref 41–137)
ALT SERPL-CCNC: 43 IU/L (ref 10–40)
ANION GAP SERPL CALCULATED.3IONS-SCNC: 7 MMOL/L (ref 4–12)
ANISOCYTOSIS: ABNORMAL
AST SERPL-CCNC: 26 IU/L (ref 15–41)
BASOPHILS RELATIVE PERCENT: 0.4 % (ref 0–2)
BILIRUB SERPL-MCNC: 2.1 MG/DL (ref 0.2–1)
BUN BLDV-MCNC: 8 MG/DL (ref 7–20)
C-REACTIVE PROTEIN: < 0.5 MG/DL
CALCIUM SERPL-MCNC: 8.2 MG/DL (ref 8.8–10.5)
CHLORIDE BLD-SCNC: 106 MEQ/L (ref 101–111)
CO2: 26 MEQ/L (ref 21–32)
CREAT SERPL-MCNC: 0.87 MG/DL (ref 0.7–1.3)
CREATININE CLEARANCE: >60
EOSINOPHILS RELATIVE PERCENT: 2.4 % (ref 0–6)
GLUCOSE: 94 MG/DL (ref 70–110)
HCT VFR BLD CALC: 37.8 % (ref 40–49)
HEMOGLOBIN: 12.8 GM/DL (ref 13.5–16.5)
LYMPHOCYTES RELATIVE PERCENT: 13.5 % (ref 15–45)
MACROCYTOSIS: ABNORMAL
MCH RBC QN AUTO: 38.8 PG (ref 27.5–33)
MCHC RBC AUTO-ENTMCNC: 33.8 GM/DL (ref 33–36)
MCV RBC AUTO: 114.6 CU MIC (ref 80–97)
MONOCYTES RELATIVE PERCENT: 8.5 % (ref 2–10)
NEUTROPHILS RELATIVE PERCENT: 75.2 % (ref 40–70)
NUCLEATED RBCS: 0.1 /100 WBC
PDW BLD-RTO: 15.8 % (ref 12–16)
PLATELET # BLD: 258 TH/CMM (ref 150–400)
POTASSIUM SERPL-SCNC: 4.1 MEQ/L (ref 3.6–5)
RBC # BLD: 3.29 MIL/CMM (ref 4.5–6)
SEDIMENTATION RATE, ERYTHROCYTE: 4 MM/HR
SODIUM BLD-SCNC: 139 MEQ/L (ref 135–145)
TOTAL PROTEIN: 6.6 G/DL (ref 6.2–8)
WBC # BLD: 6.1 TH/CMM (ref 4.4–10.5)

## 2018-08-20 RX ORDER — LEFLUNOMIDE 10 MG/1
10 TABLET ORAL DAILY
Qty: 30 TABLET | Refills: 0 | Status: SHIPPED | OUTPATIENT
Start: 2018-08-20 | End: 2018-08-20 | Stop reason: SDUPTHER

## 2018-08-20 RX ORDER — LEFLUNOMIDE 10 MG/1
5 TABLET ORAL DAILY
Qty: 30 TABLET | Refills: 0 | Status: SHIPPED | OUTPATIENT
Start: 2018-08-20 | End: 2018-08-21 | Stop reason: SDUPTHER

## 2018-08-20 RX ORDER — FOLIC ACID 1 MG/1
2 TABLET ORAL DAILY
Qty: 180 TABLET | Refills: 1 | Status: SHIPPED | OUTPATIENT
Start: 2018-08-20 | End: 2018-08-21 | Stop reason: SDUPTHER

## 2018-08-21 ENCOUNTER — OFFICE VISIT (OUTPATIENT)
Dept: RHEUMATOLOGY | Age: 53
End: 2018-08-21
Payer: COMMERCIAL

## 2018-08-21 VITALS
HEART RATE: 62 BPM | BODY MASS INDEX: 24.09 KG/M2 | OXYGEN SATURATION: 100 % | SYSTOLIC BLOOD PRESSURE: 160 MMHG | DIASTOLIC BLOOD PRESSURE: 80 MMHG | WEIGHT: 153.8 LBS

## 2018-08-21 DIAGNOSIS — Z51.81 MEDICATION MONITORING ENCOUNTER: ICD-10-CM

## 2018-08-21 DIAGNOSIS — M06.30 RHEUMATOID NODULOSIS (HCC): ICD-10-CM

## 2018-08-21 DIAGNOSIS — M19.042 OSTEOARTHRITIS OF BOTH HANDS, UNSPECIFIED OSTEOARTHRITIS TYPE: ICD-10-CM

## 2018-08-21 DIAGNOSIS — M19.041 OSTEOARTHRITIS OF BOTH HANDS, UNSPECIFIED OSTEOARTHRITIS TYPE: ICD-10-CM

## 2018-08-21 DIAGNOSIS — M05.79 RHEUMATOID ARTHRITIS INVOLVING MULTIPLE SITES WITH POSITIVE RHEUMATOID FACTOR (HCC): Primary | ICD-10-CM

## 2018-08-21 DIAGNOSIS — F17.219 CIGARETTE NICOTINE DEPENDENCE WITH NICOTINE-INDUCED DISORDER: ICD-10-CM

## 2018-08-21 PROCEDURE — G8420 CALC BMI NORM PARAMETERS: HCPCS | Performed by: INTERNAL MEDICINE

## 2018-08-21 PROCEDURE — 3017F COLORECTAL CA SCREEN DOC REV: CPT | Performed by: INTERNAL MEDICINE

## 2018-08-21 PROCEDURE — 99214 OFFICE O/P EST MOD 30 MIN: CPT | Performed by: INTERNAL MEDICINE

## 2018-08-21 PROCEDURE — G8427 DOCREV CUR MEDS BY ELIG CLIN: HCPCS | Performed by: INTERNAL MEDICINE

## 2018-08-21 PROCEDURE — 4004F PT TOBACCO SCREEN RCVD TLK: CPT | Performed by: INTERNAL MEDICINE

## 2018-08-21 RX ORDER — LEFLUNOMIDE 10 MG/1
5 TABLET ORAL DAILY
Qty: 30 TABLET | Refills: 0 | Status: SHIPPED | OUTPATIENT
Start: 2018-08-21 | End: 2018-09-13 | Stop reason: SINTOL

## 2018-08-21 RX ORDER — FOLIC ACID 1 MG/1
2 TABLET ORAL DAILY
Qty: 180 TABLET | Refills: 1 | Status: SHIPPED | OUTPATIENT
Start: 2018-08-21 | End: 2018-11-30 | Stop reason: SDUPTHER

## 2018-08-21 RX ORDER — MELOXICAM 7.5 MG/1
7.5 TABLET ORAL DAILY
Qty: 30 TABLET | Refills: 2 | Status: SHIPPED | OUTPATIENT
Start: 2018-08-21 | End: 2018-11-30 | Stop reason: SDUPTHER

## 2018-08-21 ASSESSMENT — ENCOUNTER SYMPTOMS
EYES NEGATIVE: 1
GASTROINTESTINAL NEGATIVE: 1
BACK PAIN: 0
ORTHOPNEA: 1

## 2018-08-21 ASSESSMENT — JOINT PAIN
TOTAL NUMBER OF SWOLLEN JOINTS: 6
TOTAL NUMBER OF TENDER JOINTS: 9

## 2018-08-21 NOTE — PROGRESS NOTES
[facility]  Rheumatology Adult Follow up Note     Date: 8/21/2018  MRN: [id number]  PT CANNOT READ or Write   rheumatoid arthritis. - Mild ALT elevation  - MTX 25mg p.o weekly, ARava 10mg daily, FA 2 mg daily   HPI:   Natalie Lane  is a(n)53 y.o. male with a hx of Rheumatoid arthritis w/ nodulosis here for the f/u evaluation of his rheumatoid arthritis. - Clois Cambric decreased to 5 mg   - no RA flares since the last evaluation   - pain and swelling of the left hand and wrist s/p slip and fall. Decreased swelling since the fall. Mild joint pains in the left > right hand. Pain currently 1/10. Localized, intermittent. Timing: mornings. Aggravating factors: cold exposure,  Knees and neck: unknown. Alleviating factors: Mobic (some relief), Prednisone (some relief)    Am stiffness lasting about 30 minutes  Denies joint swelling currently   decreased RA nodules. Continued smoking    Current therapy:   - mobic 7.5mg daily prn.  - methotrexate 25mg subq weekly (2017 -->25 mg 1/22/18)   - arava 10mg daily (may 2018)       Previous therapy:  - plaquenil (? Relief)   -sulfasalazine (gi upset)             ROS:  Review of Systems   Constitutional: Positive for malaise/fatigue (intermittent). HENT: Negative. Eyes: Negative. Cardiovascular: Positive for orthopnea (improved w/ laying on side). Gastrointestinal: Negative. Genitourinary: Negative. Musculoskeletal: Negative for back pain, myalgias and neck pain. Skin: Negative. Neurological: Negative. Endo/Heme/Allergies: Bruises/bleeds easily. Psychiatric/Behavioral: Negative for depression, substance abuse and suicidal ideas. The patient has insomnia.         PAST MEDICAL HISTORY  Past Medical History:   Diagnosis Date    Rheumatoid arthritis ([facility] [address]        SOCIAL HISTORY  Social History     Social History    Marital status: Single     Spouse name: N/A    Number of children: N/A    Years of education: N/A     Social

## 2018-09-07 ENCOUNTER — TELEPHONE (OUTPATIENT)
Dept: RHEUMATOLOGY | Age: 53
End: 2018-09-07

## 2018-09-07 DIAGNOSIS — Z51.81 MEDICATION MONITORING ENCOUNTER: Primary | ICD-10-CM

## 2018-09-07 NOTE — TELEPHONE ENCOUNTER
Patient does not have any lab orders in epic active. Please advise if patient needs more and I will mail them. Thank you.

## 2018-09-13 DIAGNOSIS — R79.89 LFT ELEVATION: Primary | ICD-10-CM

## 2018-09-13 LAB
A/G RATIO: 1.8 (ref 1.5–2.5)
ABSOLUTE BASO #: 0 /CMM (ref 0–200)
ABSOLUTE EOS #: 200 /CMM (ref 0–500)
ABSOLUTE LYMPH #: 1100 /CMM (ref 1000–4800)
ABSOLUTE MONO #: 700 /CMM (ref 0–800)
ABSOLUTE NEUT #: 3400 /CMM (ref 1800–7700)
ALBUMIN SERPL-MCNC: 4.2 GM/DL (ref 3.5–5)
ALP BLD-CCNC: 84 IU/L (ref 41–137)
ALT SERPL-CCNC: 75 IU/L (ref 10–40)
ANION GAP SERPL CALCULATED.3IONS-SCNC: 5 MMOL/L (ref 4–12)
AST SERPL-CCNC: 39 IU/L (ref 15–41)
BASOPHILS RELATIVE PERCENT: 0.5 % (ref 0–2)
BILIRUB SERPL-MCNC: 1.3 MG/DL (ref 0.2–1)
BUN BLDV-MCNC: 13 MG/DL (ref 7–20)
C-REACTIVE PROTEIN: 0.6 MG/DL
CALCIUM SERPL-MCNC: 8.7 MG/DL (ref 8.8–10.5)
CHLORIDE BLD-SCNC: 110 MEQ/L (ref 101–111)
CO2: 26 MEQ/L (ref 21–32)
CREAT SERPL-MCNC: 0.79 MG/DL (ref 0.7–1.3)
CREATININE CLEARANCE: >60
EOSINOPHILS RELATIVE PERCENT: 3.7 % (ref 0–6)
GLUCOSE: 99 MG/DL (ref 70–110)
HCT VFR BLD CALC: 38.9 % (ref 40–49)
HEMOGLOBIN: 13.1 GM/DL (ref 13.5–16.5)
LYMPHOCYTES RELATIVE PERCENT: 19.9 % (ref 15–45)
MACROCYTOSIS: ABNORMAL
MCH RBC QN AUTO: 37.8 PG (ref 27.5–33)
MCHC RBC AUTO-ENTMCNC: 33.6 GM/DL (ref 33–36)
MCV RBC AUTO: 112.6 CU MIC (ref 80–97)
MONOCYTES RELATIVE PERCENT: 12.4 % (ref 2–10)
MORPHOLOGY: NORMAL
NEUTROPHILS RELATIVE PERCENT: 63.5 % (ref 40–70)
NUCLEATED RBCS: 0.1 /100 WBC
PDW BLD-RTO: 15 % (ref 12–16)
PLATELET # BLD: 226 TH/CMM (ref 150–400)
POTASSIUM SERPL-SCNC: 4.9 MEQ/L (ref 3.6–5)
RBC # BLD: 3.46 MIL/CMM (ref 4.5–6)
SEDIMENTATION RATE, ERYTHROCYTE: 6 MM/HR
SODIUM BLD-SCNC: 141 MEQ/L (ref 135–145)
TOTAL PROTEIN: 6.5 G/DL (ref 6.2–8)
WBC # BLD: 5.4 TH/CMM (ref 4.4–10.5)

## 2018-09-27 LAB
A/G RATIO: 1.8 (ref 1.5–2.5)
ABSOLUTE BASO #: 0 /CMM (ref 0–200)
ABSOLUTE EOS #: 200 /CMM (ref 0–500)
ABSOLUTE LYMPH #: 1000 /CMM (ref 1000–4800)
ABSOLUTE MONO #: 700 /CMM (ref 0–800)
ABSOLUTE NEUT #: 2900 /CMM (ref 1800–7700)
ALBUMIN SERPL-MCNC: 4.5 GM/DL (ref 3.5–5)
ALP BLD-CCNC: 87 IU/L (ref 41–137)
ALT SERPL-CCNC: 80 IU/L (ref 10–40)
ANION GAP SERPL CALCULATED.3IONS-SCNC: 5 MMOL/L (ref 4–12)
AST SERPL-CCNC: 35 IU/L (ref 15–41)
BASOPHILS RELATIVE PERCENT: 0.7 % (ref 0–2)
BILIRUB SERPL-MCNC: 1.1 MG/DL (ref 0.2–1)
BUN BLDV-MCNC: 10 MG/DL (ref 7–20)
C-REACTIVE PROTEIN: 0.6 MG/DL
CALCIUM SERPL-MCNC: 8.9 MG/DL (ref 8.8–10.5)
CHLORIDE BLD-SCNC: 106 MEQ/L (ref 101–111)
CO2: 27 MEQ/L (ref 21–32)
CREAT SERPL-MCNC: 0.81 MG/DL (ref 0.7–1.3)
CREATININE CLEARANCE: >60
EOSINOPHILS RELATIVE PERCENT: 3.9 % (ref 0–6)
GLUCOSE: 96 MG/DL (ref 70–110)
HCT VFR BLD CALC: 40.6 % (ref 40–49)
HEMOGLOBIN: 13.6 GM/DL (ref 13.5–16.5)
LYMPHOCYTES RELATIVE PERCENT: 20.7 % (ref 15–45)
MACROCYTOSIS: ABNORMAL
MCH RBC QN AUTO: 37.8 PG (ref 27.5–33)
MCHC RBC AUTO-ENTMCNC: 33.5 GM/DL (ref 33–36)
MCV RBC AUTO: 112.9 CU MIC (ref 80–97)
MONOCYTES RELATIVE PERCENT: 13.9 % (ref 2–10)
NEUTROPHILS RELATIVE PERCENT: 60.8 % (ref 40–70)
NUCLEATED RBCS: 0.2 /100 WBC
PDW BLD-RTO: 15.1 % (ref 12–16)
PLATELET # BLD: 255 TH/CMM (ref 150–400)
POTASSIUM SERPL-SCNC: 4.5 MEQ/L (ref 3.6–5)
RBC # BLD: 3.6 MIL/CMM (ref 4.5–6)
SEDIMENTATION RATE, ERYTHROCYTE: 4 MM/HR
SODIUM BLD-SCNC: 138 MEQ/L (ref 135–145)
TOTAL PROTEIN: 7 G/DL (ref 6.2–8)
WBC # BLD: 4.8 TH/CMM (ref 4.4–10.5)

## 2018-09-28 DIAGNOSIS — R79.89 LFT ELEVATION: Primary | ICD-10-CM

## 2018-10-05 LAB
ALBUMIN SERPL-MCNC: 4.6 GM/DL (ref 3.5–5)
ALP BLD-CCNC: 93 IU/L (ref 41–137)
ALT SERPL-CCNC: 82 IU/L (ref 10–40)
AST SERPL-CCNC: 39 IU/L (ref 15–41)
BILIRUB SERPL-MCNC: 1 MG/DL (ref 0.2–1)
BILIRUBIN DIRECT: 0.2 MG/DL (ref 0.1–0.2)
TOTAL PROTEIN: 7.3 G/DL (ref 6.2–8)

## 2018-10-08 ENCOUNTER — TELEPHONE (OUTPATIENT)
Dept: RHEUMATOLOGY | Age: 53
End: 2018-10-08

## 2018-10-08 DIAGNOSIS — R79.89 LFT ELEVATION: ICD-10-CM

## 2018-10-08 DIAGNOSIS — Z51.81 MEDICATION MONITORING ENCOUNTER: Primary | ICD-10-CM

## 2018-10-08 NOTE — TELEPHONE ENCOUNTER
----- Message from Rodo Morrison DO sent at 10/8/2018  6:57 AM EDT -----  Please informed patient that his liver function tests are mildly elevated despite discontinuation of the [address] Please have labs repeated as scheduled in 2 weeks. Decrease methotrexate to 10 mg weekly. Given the continued rheumatoid arthritis he would likely benefit from the addition of a biologic such as Humira, Enbrel or other similar agent that may allow for better control of his rheumatoid arthritis.

## 2018-10-24 LAB
A/G RATIO: 1.9 (ref 1.5–2.5)
ABSOLUTE BASO #: 0 /CMM (ref 0–200)
ABSOLUTE EOS #: 100 /CMM (ref 0–500)
ABSOLUTE LYMPH #: 1100 /CMM (ref 1000–4800)
ABSOLUTE MONO #: 500 /CMM (ref 0–800)
ABSOLUTE NEUT #: 4300 /CMM (ref 1800–7700)
ALBUMIN SERPL-MCNC: 4.6 GM/DL (ref 3.5–5)
ALP BLD-CCNC: 90 IU/L (ref 41–137)
ALT SERPL-CCNC: 20 IU/L (ref 10–40)
ANION GAP SERPL CALCULATED.3IONS-SCNC: 7 MMOL/L (ref 4–12)
AST SERPL-CCNC: 19 IU/L (ref 15–41)
BASOPHILS RELATIVE PERCENT: 0.7 % (ref 0–2)
BILIRUB SERPL-MCNC: 1.6 MG/DL (ref 0.2–1)
BUN BLDV-MCNC: 14 MG/DL (ref 7–20)
C-REACTIVE PROTEIN: 0.7 MG/DL
CALCIUM SERPL-MCNC: 8.7 MG/DL (ref 8.8–10.5)
CHLORIDE BLD-SCNC: 108 MEQ/L (ref 101–111)
CO2: 26 MEQ/L (ref 21–32)
CREAT SERPL-MCNC: 0.79 MG/DL (ref 0.7–1.3)
CREATININE CLEARANCE: >60
EOSINOPHILS RELATIVE PERCENT: 2.2 % (ref 0–6)
GLUCOSE: 105 MG/DL (ref 70–110)
HCT VFR BLD CALC: 43.8 % (ref 40–49)
HEMOGLOBIN: 14.6 GM/DL (ref 13.5–16.5)
LYMPHOCYTES RELATIVE PERCENT: 18.6 % (ref 15–45)
MACROCYTOSIS: ABNORMAL
MCH RBC QN AUTO: 35.9 PG (ref 27.5–33)
MCHC RBC AUTO-ENTMCNC: 33.3 GM/DL (ref 33–36)
MCV RBC AUTO: 107.8 CU MIC (ref 80–97)
MONOCYTES RELATIVE PERCENT: 8.3 % (ref 2–10)
NEUTROPHILS RELATIVE PERCENT: 70.2 % (ref 40–70)
NUCLEATED RBCS: 0.1 /100 WBC
PDW BLD-RTO: 13.1 % (ref 12–16)
PLATELET # BLD: 229 TH/CMM (ref 150–400)
POTASSIUM SERPL-SCNC: 4.7 MEQ/L (ref 3.6–5)
RBC # BLD: 4.06 MIL/CMM (ref 4.5–6)
SEDIMENTATION RATE, ERYTHROCYTE: 4 MM/HR
SODIUM BLD-SCNC: 141 MEQ/L (ref 135–145)
TOTAL PROTEIN: 7 G/DL (ref 6.2–8)
WBC # BLD: 6.1 TH/CMM (ref 4.4–10.5)

## 2018-10-25 ENCOUNTER — TELEPHONE (OUTPATIENT)
Dept: RHEUMATOLOGY | Age: 53
End: 2018-10-25

## 2018-10-25 NOTE — TELEPHONE ENCOUNTER
----- Message from Andrzej Andino DO sent at 10/24/2018  5:37 PM EDT -----  Please inform the patient and the liver function tests have normalized.   Please increase the methotrexate 15 mg weekly (six tablets)

## 2018-11-16 LAB
A/G RATIO: 1.7 (ref 1.5–2.5)
ABSOLUTE BASO #: 0 /CMM (ref 0–200)
ABSOLUTE EOS #: 100 /CMM (ref 0–500)
ABSOLUTE LYMPH #: 1100 /CMM (ref 1000–4800)
ABSOLUTE MONO #: 500 /CMM (ref 0–800)
ABSOLUTE NEUT #: 3500 /CMM (ref 1800–7700)
ALBUMIN SERPL-MCNC: 4.3 GM/DL (ref 3.5–5)
ALP BLD-CCNC: 101 IU/L (ref 41–137)
ALT SERPL-CCNC: 20 IU/L (ref 10–40)
ANION GAP SERPL CALCULATED.3IONS-SCNC: 5 MMOL/L (ref 4–12)
AST SERPL-CCNC: 20 IU/L (ref 15–41)
BASOPHILS RELATIVE PERCENT: 0.7 % (ref 0–2)
BILIRUB SERPL-MCNC: 1.6 MG/DL (ref 0.2–1)
BUN BLDV-MCNC: 11 MG/DL (ref 7–20)
C-REACTIVE PROTEIN: 0.7 MG/DL
CALCIUM SERPL-MCNC: 9 MG/DL (ref 8.8–10.5)
CHLORIDE BLD-SCNC: 108 MEQ/L (ref 101–111)
CO2: 27 MEQ/L (ref 21–32)
CREAT SERPL-MCNC: 0.81 MG/DL (ref 0.7–1.3)
CREATININE CLEARANCE: >60
EOSINOPHILS RELATIVE PERCENT: 2.1 % (ref 0–6)
GLUCOSE: 104 MG/DL (ref 70–110)
HCT VFR BLD CALC: 42.5 % (ref 40–49)
HEMOGLOBIN: 14.2 GM/DL (ref 13.5–16.5)
LYMPHOCYTES RELATIVE PERCENT: 20.2 % (ref 15–45)
MACROCYTOSIS: ABNORMAL
MCH RBC QN AUTO: 35.9 PG (ref 27.5–33)
MCHC RBC AUTO-ENTMCNC: 33.4 GM/DL (ref 33–36)
MCV RBC AUTO: 107.7 CU MIC (ref 80–97)
MONOCYTES RELATIVE PERCENT: 10.4 % (ref 2–10)
NEUTROPHILS RELATIVE PERCENT: 66.6 % (ref 40–70)
NUCLEATED RBCS: 0 /100 WBC
PDW BLD-RTO: 13.8 % (ref 12–16)
PLATELET # BLD: 236 TH/CMM (ref 150–400)
POTASSIUM SERPL-SCNC: 4.6 MEQ/L (ref 3.6–5)
RBC # BLD: 3.95 MIL/CMM (ref 4.5–6)
SEDIMENTATION RATE, ERYTHROCYTE: 3 MM/HR
SODIUM BLD-SCNC: 140 MEQ/L (ref 135–145)
TOTAL PROTEIN: 6.9 G/DL (ref 6.2–8)
WBC # BLD: 5.3 TH/CMM (ref 4.4–10.5)

## 2018-11-19 ENCOUNTER — OFFICE VISIT (OUTPATIENT)
Dept: RHEUMATOLOGY | Age: 53
End: 2018-11-19
Payer: COMMERCIAL

## 2018-11-19 VITALS
BODY MASS INDEX: 25.59 KG/M2 | OXYGEN SATURATION: 98 % | DIASTOLIC BLOOD PRESSURE: 84 MMHG | WEIGHT: 163.4 LBS | HEART RATE: 69 BPM | SYSTOLIC BLOOD PRESSURE: 132 MMHG

## 2018-11-19 DIAGNOSIS — I73.00 RAYNAUD'S DISEASE WITHOUT GANGRENE: ICD-10-CM

## 2018-11-19 DIAGNOSIS — R20.2 PARESTHESIA: ICD-10-CM

## 2018-11-19 DIAGNOSIS — M19.041 OSTEOARTHRITIS OF BOTH HANDS, UNSPECIFIED OSTEOARTHRITIS TYPE: ICD-10-CM

## 2018-11-19 DIAGNOSIS — F17.219 CIGARETTE NICOTINE DEPENDENCE WITH NICOTINE-INDUCED DISORDER: ICD-10-CM

## 2018-11-19 DIAGNOSIS — Z51.81 MEDICATION MONITORING ENCOUNTER: ICD-10-CM

## 2018-11-19 DIAGNOSIS — M19.042 OSTEOARTHRITIS OF BOTH HANDS, UNSPECIFIED OSTEOARTHRITIS TYPE: ICD-10-CM

## 2018-11-19 DIAGNOSIS — M05.79 RHEUMATOID ARTHRITIS INVOLVING MULTIPLE SITES WITH POSITIVE RHEUMATOID FACTOR (HCC): Primary | ICD-10-CM

## 2018-11-19 DIAGNOSIS — M06.30 RHEUMATOID NODULOSIS (HCC): ICD-10-CM

## 2018-11-19 PROCEDURE — 4004F PT TOBACCO SCREEN RCVD TLK: CPT | Performed by: INTERNAL MEDICINE

## 2018-11-19 PROCEDURE — G8419 CALC BMI OUT NRM PARAM NOF/U: HCPCS | Performed by: INTERNAL MEDICINE

## 2018-11-19 PROCEDURE — 99214 OFFICE O/P EST MOD 30 MIN: CPT | Performed by: INTERNAL MEDICINE

## 2018-11-19 PROCEDURE — G8427 DOCREV CUR MEDS BY ELIG CLIN: HCPCS | Performed by: INTERNAL MEDICINE

## 2018-11-19 PROCEDURE — 3017F COLORECTAL CA SCREEN DOC REV: CPT | Performed by: INTERNAL MEDICINE

## 2018-11-19 PROCEDURE — G8484 FLU IMMUNIZE NO ADMIN: HCPCS | Performed by: INTERNAL MEDICINE

## 2018-11-19 RX ORDER — NIFEDIPINE 30 MG/1
30 TABLET, EXTENDED RELEASE ORAL DAILY
Qty: 30 TABLET | Refills: 1 | Status: SHIPPED | OUTPATIENT
Start: 2018-11-19 | End: 2019-01-21 | Stop reason: SDUPTHER

## 2018-11-19 ASSESSMENT — ENCOUNTER SYMPTOMS
ORTHOPNEA: 1
EYES NEGATIVE: 1
BACK PAIN: 0
GASTROINTESTINAL NEGATIVE: 1

## 2018-11-19 ASSESSMENT — JOINT PAIN
TOTAL NUMBER OF SWOLLEN JOINTS: 7
TOTAL NUMBER OF TENDER JOINTS: 9

## 2018-11-19 NOTE — PROGRESS NOTES
[facility] [facility]  Rheumatology Adult Follow up Note     Date: 11/19/2018  MRN: [id number]  PT CANNOT READ or Write     HPI:   Chris Haile  is a(n)53 y.o. male with a hx of Rheumatoid arthritis w/ nodulosis here for the f/u evaluation of his rheumatoid arthritis. Interval hx:   - Arava 10mg dil stoped 9/13/18 b/c LFT elevation   - MTX increased to 15mg q wk after LFT's normalized   - low back pain started 2 wks ago. Denies injury. Mild joint pains in the left > right hand, and left lower back. Pain currently 5/10. Localized, intermittent  Back pain. Hands: numbness/tingling Timing: mornings. Aggravating factors: cold exposure,  Back: prolonged sitting. Alleviating factors: Mobic (some relief), Prednisone (some relief), Back: movement. Am stiffness lasting about 30 minutes  Denies joint swelling currently   Continue RA nodules. At elbows, hands. Continued smoking  Current therapy:   - mobic 7.5mg daily prn.  - methotrexate 15mg subq weekly (2017 -->25 mg 1/22/18)     Previous therapy:  - plaquenil (? Relief)   -sulfasalazine (gi upset)   - arava 10mg daily (may 2018-Sept 2018 LFT elevation )           ROS:  Review of Systems   Constitutional: Positive for malaise/fatigue (intermittent). HENT: Negative. Eyes: Negative. Cardiovascular: Positive for orthopnea (improved w/ laying on side). Gastrointestinal: Negative. Genitourinary: Negative. Musculoskeletal: Negative for back pain, myalgias and neck pain. Skin: Negative. Neurological: Negative. Endo/Heme/Allergies: Bruises/bleeds easily. Psychiatric/Behavioral: Negative for depression, substance abuse and suicidal ideas. The patient has insomnia.         PAST MEDICAL HISTORY  Past Medical History:   Diagnosis Date    Rheumatoid arthritis ([facility] [address]        SOCIAL HISTORY  Social History     Social History    Marital status: Single     Spouse name: N/A    Number of children: N/A    Years of education: N/A Social History Main Topics    Smoking status: Current Every Day Smoker     Packs/day: 0.50     Years: 35.00     Types: Cigarettes    Smokeless tobacco: Never Used    Alcohol use No    Drug use: No    Sexual activity: Not Asked     Other Topics Concern    None     Social History Narrative    None       FAMILY HISTORY  Family History   Problem Relation Age of Onset    High Blood Pressure Mother     Kidney Disease Father        SURGICAL HISTORY  Past Surgical History:   Procedure Laterality Date    ROTATOR CUFF REPAIR Right     shoulder       ALLERGIES  No Known Allergies    CURRENT MEDICATIONS  Current Outpatient Prescriptions   Medication Sig Dispense Refill    methotrexate (RHEUMATREX) 2.5 MG chemo tablet Take 6 tablets by mouth every 7 days 40 tablet 0    meloxicam (MOBIC) 7.5 MG tablet Take 1 tablet by mouth daily 30 tablet 2    folic acid (FOLVITE) 1 MG tablet Take 2 tablets by mouth daily 180 tablet 1    nicotine (NICODERM CQ) 21 MG/24HR Place 1 patch onto the skin every 24 hours 30 patch 3     No current facility-administered medications for this visit. Objective:  /84 (Site: Right Upper Arm, Position: Sitting, Cuff Size: Medium Adult)   Pulse 69   Wt 163 lb 6.4 oz (74.1 kg)   SpO2 98%   BMI 25.59 kg/m²     General: No distress. Alert. Eyes: P ERRL. No sclera icterus. No conjunctival injection. ENT: No discharge. Pharynx clear. Neck: Trachea midline. Normal thyroid. Resp: No accessory muscle use. No crackles. No wheezing. No rhonchi. No dullness on percussion. CV: Regular rate. Regular rhythm. No mumur or rub. No edema. M/S:   Upper extremities:  Muscle strength 5/5, FROM, No Synovitis   Shoulder: intact ROM. Negative bilateral hawking, bhavna, speed, infraspinatus, scarf. Elbows: no swelling. Nodules along the olecranon process, swelling of the Left olecranon, nodules along the mid-forearm  Wrist: non-tender, no synovitis.    Fingers: synovitis & Tenderness:

## 2018-11-20 ENCOUNTER — TELEPHONE (OUTPATIENT)
Dept: RHEUMATOLOGY | Age: 53
End: 2018-11-20

## 2018-11-30 NOTE — TELEPHONE ENCOUNTER
Maegan Pradhan called requesting a refill on the following medications:  Requested Prescriptions     Pending Prescriptions Disp Refills    meloxicam (MOBIC) 7.5 MG tablet 30 tablet 2     Sig: Take 1 tablet by mouth daily    folic acid (FOLVITE) 1 MG tablet 180 tablet 1     Sig: Take 2 tablets by mouth daily     Pharmacy verified:  .pv    [facility]    Date of last visit: 11/19/18  Date of next visit (if applicable): 4/91/4012

## 2018-12-03 RX ORDER — FOLIC ACID 1 MG/1
2 TABLET ORAL DAILY
Qty: 180 TABLET | Refills: 1 | Status: SHIPPED | OUTPATIENT
Start: 2018-12-03 | End: 2019-02-25 | Stop reason: SDUPTHER

## 2018-12-03 RX ORDER — MELOXICAM 7.5 MG/1
7.5 TABLET ORAL DAILY
Qty: 30 TABLET | Refills: 2 | Status: SHIPPED | OUTPATIENT
Start: 2018-12-03 | End: 2019-02-25 | Stop reason: SDUPTHER

## 2018-12-11 LAB
A/G RATIO: 1.6 (ref 1.5–2.5)
ABSOLUTE BASO #: 100 /CMM (ref 0–200)
ABSOLUTE EOS #: 200 /CMM (ref 0–500)
ABSOLUTE LYMPH #: 1500 /CMM (ref 1000–4800)
ABSOLUTE MONO #: 500 /CMM (ref 0–800)
ABSOLUTE NEUT #: 4300 /CMM (ref 1800–7700)
ALBUMIN SERPL-MCNC: 4.2 GM/DL (ref 3.5–5)
ALP BLD-CCNC: 92 IU/L (ref 41–137)
ALT SERPL-CCNC: 23 IU/L (ref 10–40)
ANION GAP SERPL CALCULATED.3IONS-SCNC: 3 MMOL/L (ref 4–12)
AST SERPL-CCNC: 18 IU/L (ref 15–41)
BASOPHILS RELATIVE PERCENT: 0.8 % (ref 0–2)
BILIRUB SERPL-MCNC: 1.1 MG/DL (ref 0.2–1)
BUN BLDV-MCNC: 9 MG/DL (ref 7–20)
C-REACTIVE PROTEIN: 0.5 MG/DL
CALCIUM SERPL-MCNC: 9 MG/DL (ref 8.8–10.5)
CHLORIDE BLD-SCNC: 106 MEQ/L (ref 101–111)
CO2: 27 MEQ/L (ref 21–32)
CREAT SERPL-MCNC: 0.78 MG/DL (ref 0.7–1.3)
CREATININE CLEARANCE: >60
EOSINOPHILS RELATIVE PERCENT: 2.5 % (ref 0–6)
GLUCOSE: 100 MG/DL (ref 70–110)
HCT VFR BLD CALC: 40.9 % (ref 40–49)
HEMOGLOBIN: 13.8 GM/DL (ref 13.5–16.5)
LYMPHOCYTES RELATIVE PERCENT: 22.6 % (ref 15–45)
MACROCYTOSIS: ABNORMAL
MCH RBC QN AUTO: 36.4 PG (ref 27.5–33)
MCHC RBC AUTO-ENTMCNC: 33.7 GM/DL (ref 33–36)
MCV RBC AUTO: 108.2 CU MIC (ref 80–97)
MONOCYTES RELATIVE PERCENT: 7.5 % (ref 2–10)
NEUTROPHILS RELATIVE PERCENT: 66.6 % (ref 40–70)
NUCLEATED RBCS: 0.1 /100 WBC
PDW BLD-RTO: 13.3 % (ref 12–16)
PLATELET # BLD: 283 TH/CMM (ref 150–400)
POTASSIUM SERPL-SCNC: 4.5 MEQ/L (ref 3.6–5)
RBC # BLD: 3.78 MIL/CMM (ref 4.5–6)
SEDIMENTATION RATE, ERYTHROCYTE: 5 MM/HR
SODIUM BLD-SCNC: 136 MEQ/L (ref 135–145)
TOTAL PROTEIN: 6.9 G/DL (ref 6.2–8)
WBC # BLD: 6.4 TH/CMM (ref 4.4–10.5)

## 2019-01-15 DIAGNOSIS — M05.79 RHEUMATOID ARTHRITIS INVOLVING MULTIPLE SITES WITH POSITIVE RHEUMATOID FACTOR (HCC): Primary | ICD-10-CM

## 2019-01-15 LAB
A/G RATIO: 1.7 (ref 1.5–2.5)
ABSOLUTE BASO #: 0 /CMM (ref 0–200)
ABSOLUTE EOS #: 100 /CMM (ref 0–500)
ABSOLUTE LYMPH #: 1400 /CMM (ref 1000–4800)
ABSOLUTE MONO #: 600 /CMM (ref 0–800)
ABSOLUTE NEUT #: 4200 /CMM (ref 1800–7700)
ALBUMIN SERPL-MCNC: 4.6 GM/DL (ref 3.5–5)
ALP BLD-CCNC: 89 IU/L (ref 41–137)
ALT SERPL-CCNC: 26 IU/L (ref 10–40)
ANION GAP SERPL CALCULATED.3IONS-SCNC: 5 MMOL/L (ref 4–12)
AST SERPL-CCNC: 21 IU/L (ref 15–41)
BASOPHILS RELATIVE PERCENT: 0.7 % (ref 0–2)
BILIRUB SERPL-MCNC: 1.5 MG/DL (ref 0.2–1)
BUN BLDV-MCNC: 10 MG/DL (ref 7–20)
C-REACTIVE PROTEIN: 0.5 MG/DL
CALCIUM SERPL-MCNC: 9.1 MG/DL (ref 8.8–10.5)
CHLORIDE BLD-SCNC: 106 MEQ/L (ref 101–111)
CO2: 27 MEQ/L (ref 21–32)
CREAT SERPL-MCNC: 0.77 MG/DL (ref 0.7–1.3)
CREATININE CLEARANCE: >60
EOSINOPHILS RELATIVE PERCENT: 2 % (ref 0–6)
GLUCOSE: 94 MG/DL (ref 70–110)
HCT VFR BLD CALC: 43.9 % (ref 40–49)
HEMOGLOBIN: 14.5 GM/DL (ref 13.5–16.5)
LYMPHOCYTES RELATIVE PERCENT: 22.4 % (ref 15–45)
MACROCYTOSIS: ABNORMAL
MCH RBC QN AUTO: 36.1 PG (ref 27.5–33)
MCHC RBC AUTO-ENTMCNC: 33.1 GM/DL (ref 33–36)
MCV RBC AUTO: 109.1 CU MIC (ref 80–97)
MONOCYTES RELATIVE PERCENT: 9.5 % (ref 2–10)
NEUTROPHILS RELATIVE PERCENT: 65.4 % (ref 40–70)
NUCLEATED RBCS: 0.4 /100 WBC
PDW BLD-RTO: 12.9 % (ref 12–16)
PLATELET # BLD: 265 TH/CMM (ref 150–400)
POTASSIUM SERPL-SCNC: 4.7 MEQ/L (ref 3.6–5)
RBC # BLD: 4.02 MIL/CMM (ref 4.5–6)
SEDIMENTATION RATE, ERYTHROCYTE: 5 MM/HR
SODIUM BLD-SCNC: 138 MEQ/L (ref 135–145)
TOTAL PROTEIN: 7.3 G/DL (ref 6.2–8)
WBC # BLD: 6.4 TH/CMM (ref 4.4–10.5)

## 2019-01-21 DIAGNOSIS — R20.2 PARESTHESIA: ICD-10-CM

## 2019-01-21 DIAGNOSIS — I73.00 RAYNAUD'S DISEASE WITHOUT GANGRENE: ICD-10-CM

## 2019-01-23 RX ORDER — NIFEDIPINE 30 MG/1
TABLET, FILM COATED, EXTENDED RELEASE ORAL
Qty: 30 TABLET | Refills: 1 | Status: SHIPPED | OUTPATIENT
Start: 2019-01-23 | End: 2019-03-04 | Stop reason: SDUPTHER

## 2019-02-22 LAB
A/G RATIO: 1.7 (ref 1.5–2.5)
ABSOLUTE BASO #: 0 /CMM (ref 0–200)
ABSOLUTE EOS #: 100 /CMM (ref 0–500)
ABSOLUTE LYMPH #: 1000 /CMM (ref 1000–4800)
ABSOLUTE MONO #: 500 /CMM (ref 0–800)
ABSOLUTE NEUT #: 3700 /CMM (ref 1800–7700)
ALBUMIN SERPL-MCNC: 4.4 GM/DL (ref 3.5–5)
ALP BLD-CCNC: 85 IU/L (ref 41–137)
ALT SERPL-CCNC: 19 IU/L (ref 10–40)
ANION GAP SERPL CALCULATED.3IONS-SCNC: 9 MMOL/L (ref 4–12)
AST SERPL-CCNC: 17 IU/L (ref 15–41)
BASOPHILS RELATIVE PERCENT: 0.8 % (ref 0–2)
BILIRUB SERPL-MCNC: 1.7 MG/DL (ref 0.2–1)
BUN BLDV-MCNC: 11 MG/DL (ref 7–20)
C-REACTIVE PROTEIN: 0.5 MG/DL
CALCIUM SERPL-MCNC: 8.7 MG/DL (ref 8.8–10.5)
CHLORIDE BLD-SCNC: 106 MEQ/L (ref 101–111)
CO2: 24 MEQ/L (ref 21–32)
CREAT SERPL-MCNC: 0.88 MG/DL (ref 0.7–1.3)
CREATININE CLEARANCE: >60
EOSINOPHILS RELATIVE PERCENT: 1.8 % (ref 0–6)
GLUCOSE: 100 MG/DL (ref 70–110)
HCT VFR BLD CALC: 43 % (ref 40–49)
HEMOGLOBIN: 14.2 GM/DL (ref 13.5–16.5)
LYMPHOCYTES RELATIVE PERCENT: 18.8 % (ref 15–45)
MACROCYTOSIS: ABNORMAL
MCH RBC QN AUTO: 36 PG (ref 27.5–33)
MCHC RBC AUTO-ENTMCNC: 32.9 GM/DL (ref 33–36)
MCV RBC AUTO: 109.3 CU MIC (ref 80–97)
MONOCYTES RELATIVE PERCENT: 9.9 % (ref 2–10)
NEUTROPHILS RELATIVE PERCENT: 68.7 % (ref 40–70)
NUCLEATED RBCS: 0.2 /100 WBC
PDW BLD-RTO: 13.5 % (ref 12–16)
PLATELET # BLD: 244 TH/CMM (ref 150–400)
POTASSIUM SERPL-SCNC: 4.2 MEQ/L (ref 3.6–5)
RBC # BLD: 3.94 MIL/CMM (ref 4.5–6)
SEDIMENTATION RATE, ERYTHROCYTE: 3 MM/HR
SODIUM BLD-SCNC: 139 MEQ/L (ref 135–145)
TOTAL PROTEIN: 7 G/DL (ref 6.2–8)
WBC # BLD: 5.5 TH/CMM (ref 4.4–10.5)

## 2019-02-25 ENCOUNTER — OFFICE VISIT (OUTPATIENT)
Dept: RHEUMATOLOGY | Age: 54
End: 2019-02-25
Payer: COMMERCIAL

## 2019-02-25 VITALS
DIASTOLIC BLOOD PRESSURE: 78 MMHG | WEIGHT: 170 LBS | HEART RATE: 84 BPM | OXYGEN SATURATION: 99 % | SYSTOLIC BLOOD PRESSURE: 138 MMHG | HEIGHT: 67 IN | BODY MASS INDEX: 26.68 KG/M2

## 2019-02-25 DIAGNOSIS — I73.00 RAYNAUD'S DISEASE WITHOUT GANGRENE: ICD-10-CM

## 2019-02-25 DIAGNOSIS — Z51.81 MEDICATION MONITORING ENCOUNTER: ICD-10-CM

## 2019-02-25 DIAGNOSIS — M06.30 RHEUMATOID NODULOSIS (HCC): ICD-10-CM

## 2019-02-25 DIAGNOSIS — F17.219 CIGARETTE NICOTINE DEPENDENCE WITH NICOTINE-INDUCED DISORDER: ICD-10-CM

## 2019-02-25 DIAGNOSIS — M19.042 OSTEOARTHRITIS OF BOTH HANDS, UNSPECIFIED OSTEOARTHRITIS TYPE: ICD-10-CM

## 2019-02-25 DIAGNOSIS — M19.041 OSTEOARTHRITIS OF BOTH HANDS, UNSPECIFIED OSTEOARTHRITIS TYPE: ICD-10-CM

## 2019-02-25 DIAGNOSIS — M05.79 RHEUMATOID ARTHRITIS INVOLVING MULTIPLE SITES WITH POSITIVE RHEUMATOID FACTOR (HCC): Primary | ICD-10-CM

## 2019-02-25 PROCEDURE — 99214 OFFICE O/P EST MOD 30 MIN: CPT | Performed by: INTERNAL MEDICINE

## 2019-02-25 PROCEDURE — G8484 FLU IMMUNIZE NO ADMIN: HCPCS | Performed by: INTERNAL MEDICINE

## 2019-02-25 PROCEDURE — G8427 DOCREV CUR MEDS BY ELIG CLIN: HCPCS | Performed by: INTERNAL MEDICINE

## 2019-02-25 PROCEDURE — 3017F COLORECTAL CA SCREEN DOC REV: CPT | Performed by: INTERNAL MEDICINE

## 2019-02-25 PROCEDURE — 4004F PT TOBACCO SCREEN RCVD TLK: CPT | Performed by: INTERNAL MEDICINE

## 2019-02-25 PROCEDURE — G8419 CALC BMI OUT NRM PARAM NOF/U: HCPCS | Performed by: INTERNAL MEDICINE

## 2019-02-25 RX ORDER — NICOTINE 21 MG/24HR
1 PATCH, TRANSDERMAL 24 HOURS TRANSDERMAL EVERY 24 HOURS
Qty: 30 PATCH | Refills: 3 | Status: SHIPPED | OUTPATIENT
Start: 2019-02-25 | End: 2019-05-14 | Stop reason: SDUPTHER

## 2019-02-25 RX ORDER — MELOXICAM 7.5 MG/1
7.5 TABLET ORAL DAILY
Qty: 30 TABLET | Refills: 2 | Status: SHIPPED | OUTPATIENT
Start: 2019-02-25 | End: 2019-05-14 | Stop reason: SDUPTHER

## 2019-02-25 RX ORDER — FOLIC ACID 1 MG/1
2 TABLET ORAL DAILY
Qty: 180 TABLET | Refills: 1 | Status: SHIPPED | OUTPATIENT
Start: 2019-02-25 | End: 2019-04-02 | Stop reason: SDUPTHER

## 2019-02-25 ASSESSMENT — ENCOUNTER SYMPTOMS
RESPIRATORY NEGATIVE: 1
DIARRHEA: 0
EYES NEGATIVE: 1
NAUSEA: 0
BLOOD IN STOOL: 0
VOMITING: 0
EYE PAIN: 0
CONSTIPATION: 0
EYE REDNESS: 0

## 2019-03-04 DIAGNOSIS — R20.2 PARESTHESIA: ICD-10-CM

## 2019-03-04 DIAGNOSIS — M05.79 RHEUMATOID ARTHRITIS INVOLVING MULTIPLE SITES WITH POSITIVE RHEUMATOID FACTOR (HCC): ICD-10-CM

## 2019-03-04 DIAGNOSIS — I73.00 RAYNAUD'S DISEASE WITHOUT GANGRENE: ICD-10-CM

## 2019-03-04 RX ORDER — NIFEDIPINE 30 MG/1
30 TABLET, FILM COATED, EXTENDED RELEASE ORAL DAILY
Qty: 30 TABLET | Refills: 1 | Status: SHIPPED | OUTPATIENT
Start: 2019-03-04 | End: 2019-05-13 | Stop reason: SDUPTHER

## 2019-04-01 LAB
A/G RATIO: 1.8 (ref 1.5–2.5)
ABSOLUTE BASO #: 0 /CMM (ref 0–200)
ABSOLUTE EOS #: 100 /CMM (ref 0–500)
ABSOLUTE LYMPH #: 1400 /CMM (ref 1000–4800)
ABSOLUTE MONO #: 500 /CMM (ref 0–800)
ABSOLUTE NEUT #: 4400 /CMM (ref 1800–7700)
ALBUMIN SERPL-MCNC: 4.6 GM/DL (ref 3.5–5)
ALP BLD-CCNC: 91 IU/L (ref 41–137)
ALT SERPL-CCNC: 17 IU/L (ref 10–40)
ANION GAP SERPL CALCULATED.3IONS-SCNC: 6 MMOL/L (ref 4–12)
AST SERPL-CCNC: 18 IU/L (ref 15–41)
BASOPHILS RELATIVE PERCENT: 0.8 % (ref 0–2)
BILIRUB SERPL-MCNC: 1.7 MG/DL (ref 0.2–1)
BUN BLDV-MCNC: 10 MG/DL (ref 7–20)
C-REACTIVE PROTEIN: 0.6 MG/DL
CALCIUM SERPL-MCNC: 9.3 MG/DL (ref 8.8–10.5)
CHLORIDE BLD-SCNC: 106 MEQ/L (ref 101–111)
CO2: 26 MEQ/L (ref 21–32)
CREAT SERPL-MCNC: 0.81 MG/DL (ref 0.7–1.3)
CREATININE CLEARANCE: >60
EOSINOPHILS RELATIVE PERCENT: 1.5 % (ref 0–6)
GLUCOSE: 94 MG/DL (ref 70–110)
HCT VFR BLD CALC: 45.8 % (ref 40–49)
HEMOGLOBIN: 15.2 GM/DL (ref 13.5–16.5)
LYMPHOCYTES RELATIVE PERCENT: 21.1 % (ref 15–45)
MACROCYTOSIS: ABNORMAL
MCH RBC QN AUTO: 35.8 PG (ref 27.5–33)
MCHC RBC AUTO-ENTMCNC: 33.1 GM/DL (ref 33–36)
MCV RBC AUTO: 108.3 CU MIC (ref 80–97)
MONOCYTES RELATIVE PERCENT: 7.3 % (ref 2–10)
NEUTROPHILS RELATIVE PERCENT: 69.3 % (ref 40–70)
NUCLEATED RBCS: 0.3 /100 WBC
PDW BLD-RTO: 13.1 % (ref 12–16)
PLATELET # BLD: 277 TH/CMM (ref 150–400)
POTASSIUM SERPL-SCNC: 4.9 MEQ/L (ref 3.6–5)
RBC # BLD: 4.23 MIL/CMM (ref 4.5–6)
SEDIMENTATION RATE, ERYTHROCYTE: 3 MM/HR
SODIUM BLD-SCNC: 138 MEQ/L (ref 135–145)
TOTAL PROTEIN: 7.1 G/DL (ref 6.2–8)
WBC # BLD: 6.4 TH/CMM (ref 4.4–10.5)

## 2019-04-02 DIAGNOSIS — M05.79 RHEUMATOID ARTHRITIS INVOLVING MULTIPLE SITES WITH POSITIVE RHEUMATOID FACTOR (HCC): ICD-10-CM

## 2019-04-02 RX ORDER — FOLIC ACID 1 MG/1
2 TABLET ORAL DAILY
Qty: 180 TABLET | Refills: 1 | Status: SHIPPED | OUTPATIENT
Start: 2019-04-02 | End: 2019-05-13 | Stop reason: SDUPTHER

## 2019-04-02 NOTE — PROGRESS NOTES
Diagnosis Orders   1. Rheumatoid arthritis involving multiple sites with positive rheumatoid factor (HCC)  methotrexate (RHEUMATREX) 2.5 MG chemo tablet    folic acid (FOLVITE) 1 MG tablet     Med monitoring  - labs q 8 weeks. - methotrexate refilled.

## 2019-05-10 ENCOUNTER — TELEPHONE (OUTPATIENT)
Dept: RHEUMATOLOGY | Age: 54
End: 2019-05-10

## 2019-05-10 DIAGNOSIS — I73.00 RAYNAUD'S DISEASE WITHOUT GANGRENE: ICD-10-CM

## 2019-05-10 DIAGNOSIS — M05.79 RHEUMATOID ARTHRITIS INVOLVING MULTIPLE SITES WITH POSITIVE RHEUMATOID FACTOR (HCC): ICD-10-CM

## 2019-05-10 DIAGNOSIS — F17.219 CIGARETTE NICOTINE DEPENDENCE WITH NICOTINE-INDUCED DISORDER: ICD-10-CM

## 2019-05-10 DIAGNOSIS — R20.2 PARESTHESIA: ICD-10-CM

## 2019-05-10 LAB
A/G RATIO: 1.7 (ref 1.5–2.5)
ABSOLUTE BASO #: 0 /CMM (ref 0–200)
ABSOLUTE EOS #: 100 /CMM (ref 0–500)
ABSOLUTE LYMPH #: 1200 /CMM (ref 1000–4800)
ABSOLUTE MONO #: 600 /CMM (ref 0–800)
ABSOLUTE NEUT #: 6400 /CMM (ref 1800–7700)
ALBUMIN SERPL-MCNC: 4.5 GM/DL (ref 3.5–5)
ALP BLD-CCNC: 97 IU/L (ref 41–137)
ALT SERPL-CCNC: 16 IU/L (ref 10–40)
ANION GAP SERPL CALCULATED.3IONS-SCNC: 4 MMOL/L (ref 4–12)
AST SERPL-CCNC: 18 IU/L (ref 15–41)
BASOPHILS RELATIVE PERCENT: 0.4 % (ref 0–2)
BILIRUB SERPL-MCNC: 1.5 MG/DL (ref 0.2–1)
BUN BLDV-MCNC: 8 MG/DL (ref 7–20)
C-REACTIVE PROTEIN: 0.8 MG/DL
CALCIUM SERPL-MCNC: 8.6 MG/DL (ref 8.8–10.5)
CHLORIDE BLD-SCNC: 105 MEQ/L (ref 101–111)
CO2: 28 MEQ/L (ref 21–32)
CREAT SERPL-MCNC: 0.86 MG/DL (ref 0.7–1.3)
CREATININE CLEARANCE: >60
EOSINOPHILS RELATIVE PERCENT: 1.1 % (ref 0–6)
GLUCOSE: 102 MG/DL (ref 70–110)
HCT VFR BLD CALC: 45.5 % (ref 40–49)
HEMOGLOBIN: 14.5 GM/DL (ref 13.5–16.5)
LYMPHOCYTES RELATIVE PERCENT: 14.5 % (ref 15–45)
MACROCYTOSIS: ABNORMAL
MCH RBC QN AUTO: 35.4 PG (ref 27.5–33)
MCHC RBC AUTO-ENTMCNC: 32 GM/DL (ref 33–36)
MCV RBC AUTO: 110.9 CU MIC (ref 80–97)
MONOCYTES RELATIVE PERCENT: 7.4 % (ref 2–10)
NEUTROPHILS RELATIVE PERCENT: 76.6 % (ref 40–70)
NUCLEATED RBCS: 0 /100 WBC
PDW BLD-RTO: 13.9 % (ref 12–16)
PLATELET # BLD: 270 TH/CMM (ref 150–400)
POTASSIUM SERPL-SCNC: 4.8 MEQ/L (ref 3.6–5)
RBC # BLD: 4.1 MIL/CMM (ref 4.5–6)
SEDIMENTATION RATE, ERYTHROCYTE: 3 MM/HR
SODIUM BLD-SCNC: 137 MEQ/L (ref 135–145)
TOTAL PROTEIN: 7.2 G/DL (ref 6.2–8)
WBC # BLD: 8.4 TH/CMM (ref 4.4–10.5)

## 2019-05-10 NOTE — TELEPHONE ENCOUNTER
Patient calling concerned about his insurance coverage and states that he no longer has medicaid and isnt sure what to do for his labs or refills. Encouraged him to call [facility] and talk with them regarding his coverage and he needs his labs drawn ASAP before we can refill his medications. He voiced understanding.

## 2019-05-13 DIAGNOSIS — I73.00 RAYNAUD'S DISEASE WITHOUT GANGRENE: ICD-10-CM

## 2019-05-13 DIAGNOSIS — M05.79 RHEUMATOID ARTHRITIS INVOLVING MULTIPLE SITES WITH POSITIVE RHEUMATOID FACTOR (HCC): ICD-10-CM

## 2019-05-13 DIAGNOSIS — R20.2 PARESTHESIA: ICD-10-CM

## 2019-05-13 RX ORDER — FOLIC ACID 1 MG/1
2 TABLET ORAL DAILY
Qty: 180 TABLET | Refills: 1 | Status: CANCELLED | OUTPATIENT
Start: 2019-05-13

## 2019-05-13 RX ORDER — FOLIC ACID 1 MG/1
2 TABLET ORAL DAILY
Qty: 180 TABLET | Refills: 1 | Status: SHIPPED | OUTPATIENT
Start: 2019-05-13 | End: 2019-05-13 | Stop reason: SDUPTHER

## 2019-05-13 RX ORDER — NIFEDIPINE 30 MG/1
30 TABLET, FILM COATED, EXTENDED RELEASE ORAL DAILY
Qty: 30 TABLET | Refills: 1 | Status: CANCELLED | OUTPATIENT
Start: 2019-05-13

## 2019-05-13 RX ORDER — FOLIC ACID 1 MG/1
2 TABLET ORAL DAILY
Qty: 180 TABLET | Refills: 1 | Status: SHIPPED | OUTPATIENT
Start: 2019-05-13 | End: 2019-06-13 | Stop reason: SDUPTHER

## 2019-05-13 RX ORDER — NIFEDIPINE 30 MG/1
30 TABLET, FILM COATED, EXTENDED RELEASE ORAL DAILY
Qty: 30 TABLET | Refills: 1 | Status: SHIPPED | OUTPATIENT
Start: 2019-05-13 | End: 2019-06-13 | Stop reason: SDUPTHER

## 2019-05-14 RX ORDER — NICOTINE 21 MG/24HR
1 PATCH, TRANSDERMAL 24 HOURS TRANSDERMAL EVERY 24 HOURS
Qty: 30 PATCH | Refills: 3 | Status: SHIPPED | OUTPATIENT
Start: 2019-05-14 | End: 2019-06-26 | Stop reason: SDUPTHER

## 2019-05-14 RX ORDER — MELOXICAM 7.5 MG/1
7.5 TABLET ORAL DAILY
Qty: 30 TABLET | Refills: 2 | Status: SHIPPED | OUTPATIENT
Start: 2019-05-14 | End: 2019-06-13 | Stop reason: SDUPTHER

## 2019-06-13 DIAGNOSIS — R20.2 PARESTHESIA: ICD-10-CM

## 2019-06-13 DIAGNOSIS — M05.79 RHEUMATOID ARTHRITIS INVOLVING MULTIPLE SITES WITH POSITIVE RHEUMATOID FACTOR (HCC): ICD-10-CM

## 2019-06-13 DIAGNOSIS — I73.00 RAYNAUD'S DISEASE WITHOUT GANGRENE: ICD-10-CM

## 2019-06-13 LAB
A/G RATIO: 1.7 (ref 1.5–2.5)
ABSOLUTE BASO #: 0 /CMM (ref 0–200)
ABSOLUTE EOS #: 100 /CMM (ref 0–500)
ABSOLUTE LYMPH #: 1100 /CMM (ref 1000–4800)
ABSOLUTE MONO #: 500 /CMM (ref 0–800)
ABSOLUTE NEUT #: 3200 /CMM (ref 1800–7700)
ALBUMIN SERPL-MCNC: 4.3 GM/DL (ref 3.5–5)
ALP BLD-CCNC: 82 IU/L (ref 41–137)
ALT SERPL-CCNC: 16 IU/L (ref 10–40)
ANION GAP SERPL CALCULATED.3IONS-SCNC: 5 MMOL/L (ref 4–12)
AST SERPL-CCNC: 15 IU/L (ref 15–41)
BASOPHILS RELATIVE PERCENT: 0.4 % (ref 0–2)
BILIRUB SERPL-MCNC: 1.3 MG/DL (ref 0.2–1)
BUN BLDV-MCNC: 12 MG/DL (ref 7–20)
C-REACTIVE PROTEIN: 1.1 MG/DL
CALCIUM SERPL-MCNC: 9.3 MG/DL (ref 8.8–10.5)
CHLORIDE BLD-SCNC: 109 MEQ/L (ref 101–111)
CO2: 26 MEQ/L (ref 21–32)
CREAT SERPL-MCNC: 0.82 MG/DL (ref 0.6–1.3)
CREATININE CLEARANCE: >60
EOSINOPHILS RELATIVE PERCENT: 1.8 % (ref 0–6)
GLUCOSE: 95 MG/DL (ref 70–110)
HCT VFR BLD CALC: 42.7 % (ref 40–49)
HEMOGLOBIN: 14.3 GM/DL (ref 13.5–16.5)
LYMPHOCYTES RELATIVE PERCENT: 22.2 % (ref 15–45)
MACROCYTOSIS: ABNORMAL
MCH RBC QN AUTO: 37.1 PG (ref 27.5–33)
MCHC RBC AUTO-ENTMCNC: 33.5 GM/DL (ref 33–36)
MCV RBC AUTO: 110.8 CU MIC (ref 80–97)
MONOCYTES RELATIVE PERCENT: 10.6 % (ref 2–10)
NEUTROPHILS RELATIVE PERCENT: 65 % (ref 40–70)
NUCLEATED RBCS: 0.2 /100 WBC
PDW BLD-RTO: 13.9 % (ref 12–16)
PLATELET # BLD: 242 TH/CMM (ref 150–400)
POTASSIUM SERPL-SCNC: 4.8 MEQ/L (ref 3.6–5)
RBC # BLD: 3.86 MIL/CMM (ref 4.5–6)
SEDIMENTATION RATE, ERYTHROCYTE: 5 MM/HR
SODIUM BLD-SCNC: 140 MEQ/L (ref 135–145)
TOTAL PROTEIN: 6.9 G/DL (ref 6.2–8)
WBC # BLD: 4.9 TH/CMM (ref 4.4–10.5)

## 2019-06-13 RX ORDER — NIFEDIPINE 30 MG/1
30 TABLET, FILM COATED, EXTENDED RELEASE ORAL DAILY
Qty: 30 TABLET | Refills: 1 | Status: SHIPPED | OUTPATIENT
Start: 2019-06-13 | End: 2019-09-23 | Stop reason: SDUPTHER

## 2019-06-13 RX ORDER — FOLIC ACID 1 MG/1
2 TABLET ORAL DAILY
Qty: 180 TABLET | Refills: 1 | Status: SHIPPED | OUTPATIENT
Start: 2019-06-13 | End: 2019-08-28 | Stop reason: SDUPTHER

## 2019-06-13 RX ORDER — MELOXICAM 7.5 MG/1
7.5 TABLET ORAL DAILY
Qty: 30 TABLET | Refills: 2 | Status: SHIPPED | OUTPATIENT
Start: 2019-06-13 | End: 2019-12-23 | Stop reason: SDUPTHER

## 2019-06-13 NOTE — PROGRESS NOTES
Diagnosis Orders   1. Rheumatoid arthritis involving multiple sites with positive rheumatoid factor (HCC)  folic acid (FOLVITE) 1 MG tablet    meloxicam (MOBIC) 7.5 MG tablet    methotrexate (RHEUMATREX) 2.5 MG chemo tablet   2. Paresthesia  NIFEdipine (ADALAT CC) 30 MG extended release tablet   3.  Raynaud's disease without gangrene  NIFEdipine (ADALAT CC) 30 MG extended release tablet       Labs --- mild CRP &, T. Bili elevation     Repeat labs q 8 weeks

## 2019-06-26 ENCOUNTER — OFFICE VISIT (OUTPATIENT)
Dept: RHEUMATOLOGY | Age: 54
End: 2019-06-26
Payer: COMMERCIAL

## 2019-06-26 VITALS
WEIGHT: 163 LBS | HEIGHT: 67 IN | SYSTOLIC BLOOD PRESSURE: 130 MMHG | HEART RATE: 66 BPM | DIASTOLIC BLOOD PRESSURE: 74 MMHG | OXYGEN SATURATION: 99 % | BODY MASS INDEX: 25.58 KG/M2

## 2019-06-26 DIAGNOSIS — F17.219 CIGARETTE NICOTINE DEPENDENCE WITH NICOTINE-INDUCED DISORDER: ICD-10-CM

## 2019-06-26 DIAGNOSIS — M06.30 RHEUMATOID NODULOSIS (HCC): ICD-10-CM

## 2019-06-26 DIAGNOSIS — I73.00 RAYNAUD'S DISEASE WITHOUT GANGRENE: Primary | ICD-10-CM

## 2019-06-26 DIAGNOSIS — M05.79 RHEUMATOID ARTHRITIS INVOLVING MULTIPLE SITES WITH POSITIVE RHEUMATOID FACTOR (HCC): ICD-10-CM

## 2019-06-26 PROCEDURE — G8427 DOCREV CUR MEDS BY ELIG CLIN: HCPCS | Performed by: INTERNAL MEDICINE

## 2019-06-26 PROCEDURE — 99406 BEHAV CHNG SMOKING 3-10 MIN: CPT | Performed by: INTERNAL MEDICINE

## 2019-06-26 PROCEDURE — 99214 OFFICE O/P EST MOD 30 MIN: CPT | Performed by: INTERNAL MEDICINE

## 2019-06-26 PROCEDURE — G8419 CALC BMI OUT NRM PARAM NOF/U: HCPCS | Performed by: INTERNAL MEDICINE

## 2019-06-26 PROCEDURE — 4004F PT TOBACCO SCREEN RCVD TLK: CPT | Performed by: INTERNAL MEDICINE

## 2019-06-26 PROCEDURE — 3017F COLORECTAL CA SCREEN DOC REV: CPT | Performed by: INTERNAL MEDICINE

## 2019-06-26 RX ORDER — NICOTINE 21 MG/24HR
1 PATCH, TRANSDERMAL 24 HOURS TRANSDERMAL EVERY 24 HOURS
Qty: 30 PATCH | Refills: 3 | Status: SHIPPED | OUTPATIENT
Start: 2019-06-26 | End: 2020-06-25

## 2019-06-26 RX ORDER — AZATHIOPRINE 50 MG/1
TABLET ORAL
Qty: 60 TABLET | Refills: 0 | Status: SHIPPED | OUTPATIENT
Start: 2019-06-26 | End: 2019-07-29 | Stop reason: SDUPTHER

## 2019-06-26 ASSESSMENT — JOINT PAIN
TOTAL NUMBER OF SWOLLEN JOINTS: 5
TOTAL NUMBER OF TENDER JOINTS: 14

## 2019-06-26 ASSESSMENT — ENCOUNTER SYMPTOMS
VOMITING: 0
NAUSEA: 0
RESPIRATORY NEGATIVE: 1
DIARRHEA: 0
BLOOD IN STOOL: 0
EYES NEGATIVE: 1
EYE REDNESS: 0
EYE PAIN: 0
CONSTIPATION: 0

## 2019-06-26 NOTE — PROGRESS NOTES
[id number] [facility] f/u note       Date Of Service: 6/26/2019  Provider: Cleia Mai DO    Name: Deedee Gallardo   MRN: [id number]    Chief Complaint(s)      Chief Complaint   Patient presents with    Follow-up     4 MONTHS- RHUEMATOID ARTHRITIS         History of Present illness (HPI)    Deedee Gallardo   is a(n)54 y.o. male with a hx of Rheumatoid arthritis w/ nodulosis here for the f/u evaluation of his rheumatoid arthritis. Raynauds -- ACTIVE. No longer having paresthesia of hands. Rheumatoid arthritis- intermirtent flares occurring 3 times per months with < 1 day duration. Mild intermittent of the bilateral hands pain up to 5-7/10. Denies joint pain or stiffness. Continue RA nodules. At elbows, hands. + triggering of left 2-5 fingers -- for the past month         Current therapy:   - mobic 7.5mg daily prn.  - methotrexate 25 mg po weekly (2017 -->25 mg 1/22/18)   - Priocardia 30mg daily     Previous therapy:  - plaquenil (? Relief)   -sulfasalazine (gi upset)   - arava 10mg daily (may 2018-Sept 2018 LFT elevation )       Review of Systems    Review of Systems   Constitutional: Negative for fever. HENT: Negative for congestion, hearing loss and nosebleeds. Eyes: Negative. Negative for pain and redness. Respiratory: Negative. Cardiovascular: Negative. Gastrointestinal: Negative for blood in stool, constipation, diarrhea, nausea and vomiting. Genitourinary: Negative for hematuria. Musculoskeletal: Negative for myalgias. Skin: Negative for rash. Neurological: Negative for dizziness, weakness and headaches. Psychiatric/Behavioral: The patient is not nervous/anxious.               PAST MEDICAL HISTORY      Past Medical History:   Diagnosis Date    Rheumatoid arthritis ([facility] [address]        PAST SURGICAL HISTORY      Past Surgical History:   Procedure Laterality Date    ROTATOR CUFF REPAIR Right     shoulder       FAMILY HISTORY      Family History   Problem Relation Age of Onset  High Blood Pressure Mother     Kidney Disease Father        SOCIAL HISTORY      Social History     Tobacco History     Smoking Status  Current Every Day Smoker Smoking Frequency  0.5 packs/day for 35 years (17.5 pk yrs) Smoking Tobacco Type  Cigarettes    Smokeless Tobacco Use  Never Used          Alcohol History     Alcohol Use Status  No          Drug Use     Drug Use Status  No          Sexual Activity     Sexually Active  Not Asked                ALLERGIES   No Known Allergies    CURRENT MEDICATIONS      Current Outpatient Medications   Medication Sig Dispense Refill    folic acid (FOLVITE) 1 MG tablet Take 2 tablets by mouth daily 180 tablet 1    meloxicam (MOBIC) 7.5 MG tablet Take 1 tablet by mouth daily 30 tablet 2    NIFEdipine (ADALAT CC) 30 MG extended release tablet Take 1 tablet by mouth daily 30 tablet 1    methotrexate (RHEUMATREX) 2.5 MG chemo tablet Take 10 tablets by mouth every 7 days 40 tablet 1    nicotine (NICODERM CQ) 21 MG/24HR Place 1 patch onto the skin every 24 hours 30 patch 3     No current facility-administered medications for this visit. PHYSICAL EXAMINATION / OBJECTIVE     Objective:  /74 (Site: Right Upper Arm, Position: Sitting, Cuff Size: Medium Adult)   Pulse 66   Ht 5' 7.01\" (1.702 m)   Wt 163 lb (73.9 kg)   SpO2 99%   BMI 25.52 kg/m²     Physical Exam   Constitutional: He is oriented to person, place, and time. He appears well-developed and well-nourished. No distress. HENT:   Head: Normocephalic and atraumatic. Right Ear: External ear normal.   Left Ear: External ear normal.   Eyes: Pupils are equal, round, and reactive to light. Conjunctivae are normal.   Neck: Neck supple. Cardiovascular: Normal rate, regular rhythm and normal heart sounds. Pulmonary/Chest: Effort normal and breath sounds normal.   Musculoskeletal: Normal range of motion. Lymphadenopathy:     He has no cervical adenopathy.    Neurological: He is alert and oriented to person, place, and time. Skin: Skin is warm and dry. He is not diaphoretic. Vitals reviewed. Muscle strength 5/5, FROM, No Synovitis   Shoulder: non-tender, no swelling, intact ROm   Elbows: non-tender, no swelling, intact ROM   - Nodules  B/l olecranon process, mid-forearm  Wrist: non-tender, no synovitis. Fingers: + tender, + joint fullness. -  nodules at Lft palmar thum and dorsal Rt 5th MCP. Lower Extremities:   Muscle strength 5/5, FROM, No Synovitis    Physical Exam     Tenderness:   Right hand: 1st MCP, 3rd MCP, 2nd PIP, 3rd PIP, 4th PIP and 5th PIP  Left hand: 2nd MCP, 3rd MCP, 4th MCP, 5th MCP, 2nd PIP, 3rd PIP, 4th PIP and 5th PIP    Swelling:   Right hand: 2nd MCP, 3rd MCP and 5th MCP  Left hand: 1st MCP and 2nd MCP    LOWRY-28 tender joint count: 14  LOWRY-28 swollen joint count: 5  ESR (mm/hr): 5  Patient global assessment: 30  LOWRY-28 score: 4.27 (Moderate Disease Activity)        MDHAQ not filled out by pt.      Remission: <3  Low Disease Activity: <6  Moderate Disease Activity: >=6 and <=12  High Disease Activity: >12    LABS        CBC  Lab Results   Component Value Date    WBC 4.9 06/13/2019    RBC 3.86 06/13/2019    HGB 14.3 06/13/2019    HCT 42.7 06/13/2019    .8 06/13/2019    MCH 37.1 06/13/2019    MCHC 33.5 06/13/2019    RDW 13.9 06/13/2019     06/13/2019       CMP  Lab Results   Component Value Date    CALCIUM 9.30 06/13/2019    LABALBU 4.3 06/13/2019    PROT 6.9 06/13/2019     06/13/2019    K 4.8 06/13/2019    CO2 26 06/13/2019     06/13/2019    BUN 12 06/13/2019    CREATININE 0.82 06/13/2019    ALKPHOS 82 06/13/2019    ALT 16 06/13/2019    AST 15 06/13/2019       HgBA1c: No components found for: HGBA1C    No results found for: TSHFT4, TSH  No results found for: VITD25      No results found for: ANASCRN  No results found for: SSA  No results found for: SSB  No results found for: ANTI-SMITH  No results found for: DSDNAAB   No results found for: ANTIRNP  No results found for: C3, C4  No results found for: CCPAB  Lab Results   Component Value Date    RF >10 11/02/2017       No components found for: CANCASCRN, APANCASCRN  Lab Results   Component Value Date    SEDRATE 5 06/13/2019     Lab Results   Component Value Date    CRP 1.1 (H) 06/13/2019       RADIOLOGY:       ASSESSMENT/PLAN    Assessment   Plan   There are no diagnoses linked to this encounter. Pt reported rheumatoid arthritis diagnosis was around 2011  After developing joint swelling, redness,warmth of the finger and then moved into the elbows. Nodule development on the hands, elbows, left achilles tendon and two in the gluteal region over the past couple years with progressive enlargment. Evaluated a rheumatology in [place], [place] and placed on plaquenil and prednisone. Pt reports having short temper with prednisone. Pt was lost to follow up about 5-6 years ago. Numbness/tingling in the bilateral finger tips for the past 3-4 years. Difficulty making a fist for the past 4-5 years. 1. Rheumatoid arthritis :   - (+) RF: IgG, IgM > 100, IgA > 100, nodulosis, + synovitis,   - Prior tx: plaquenil (not effective) , Sulfasalazine (weakness and Gi upset) , Arava 10mg qd (LFT elevaiton 2018)      LOWRY 28: moderate dz activity    - methotrexate to 93LF q wk    - folic acid 2mg daily    - start imuran 50mg daily b/c continued nodulosis, & RA dz.               - mobic 7.5mg q daily prn.    - declined biologics     - - We discussed risks of Azathioprine including liver infection, cancer, blood dyscrasias. increased risk of post transplant lymphoma and hepatosplenic T-cell lymphoma he was specifically instructed to avoid ETOH use.  he was instructed to stop Azathiprine at the onset of any infection and instructed to call me if concern for infection.     2. Rheumatoid Nodulosis: active. - tx per # 1      3. Paresthesias of finger: resolved w/ Tx of RA      4.  Raynauds   - continue procardia 30mg daily

## 2019-07-26 LAB
A/G RATIO: 1.6 (ref 1.5–2.5)
ABSOLUTE BASO #: 0 /CMM (ref 0–200)
ABSOLUTE EOS #: 100 /CMM (ref 0–500)
ABSOLUTE LYMPH #: 800 /CMM (ref 1000–4800)
ABSOLUTE MONO #: 500 /CMM (ref 0–800)
ABSOLUTE NEUT #: 5700 /CMM (ref 1800–7700)
ALBUMIN SERPL-MCNC: 4.1 GM/DL (ref 3.5–5)
ALP BLD-CCNC: 85 IU/L (ref 41–137)
ALT SERPL-CCNC: 10 IU/L (ref 10–40)
ANION GAP SERPL CALCULATED.3IONS-SCNC: 4 MMOL/L (ref 4–12)
AST SERPL-CCNC: 11 IU/L (ref 15–41)
BASOPHILS RELATIVE PERCENT: 0.6 % (ref 0–2)
BILIRUB SERPL-MCNC: 1.4 MG/DL (ref 0.2–1)
BUN BLDV-MCNC: 13 MG/DL (ref 7–20)
C-REACTIVE PROTEIN: 0.5 MG/DL
CALCIUM SERPL-MCNC: 9.1 MG/DL (ref 8.8–10.5)
CHLORIDE BLD-SCNC: 109 MEQ/L (ref 101–111)
CO2: 27 MEQ/L (ref 21–32)
CREAT SERPL-MCNC: 0.93 MG/DL (ref 0.6–1.3)
CREATININE CLEARANCE: >60
EOSINOPHILS RELATIVE PERCENT: 1.6 % (ref 0–6)
GLUCOSE: 99 MG/DL (ref 70–110)
HCT VFR BLD CALC: 44 % (ref 40–49)
HEMOGLOBIN: 14.6 GM/DL (ref 13.5–16.5)
LYMPHOCYTES RELATIVE PERCENT: 11.1 % (ref 15–45)
MACROCYTOSIS: ABNORMAL
MCH RBC QN AUTO: 37.7 PG (ref 27.5–33)
MCHC RBC AUTO-ENTMCNC: 33.1 GM/DL (ref 33–36)
MCV RBC AUTO: 113.9 CU MIC (ref 80–97)
MONOCYTES RELATIVE PERCENT: 6.9 % (ref 2–10)
NEUTROPHILS RELATIVE PERCENT: 79.8 % (ref 40–70)
NUCLEATED RBCS: 0 /100 WBC
PDW BLD-RTO: 13.9 % (ref 12–16)
PLATELET # BLD: 243 TH/CMM (ref 150–400)
POTASSIUM SERPL-SCNC: 4.6 MEQ/L (ref 3.6–5)
RBC # BLD: 3.86 MIL/CMM (ref 4.5–6)
SEDIMENTATION RATE, ERYTHROCYTE: 1 MM/HR
SODIUM BLD-SCNC: 140 MEQ/L (ref 135–145)
TOTAL PROTEIN: 6.6 G/DL (ref 6.2–8)
WBC # BLD: 7.1 TH/CMM (ref 4.4–10.5)

## 2019-07-29 ENCOUNTER — TELEPHONE (OUTPATIENT)
Dept: RHEUMATOLOGY | Age: 54
End: 2019-07-29

## 2019-07-29 DIAGNOSIS — M05.79 RHEUMATOID ARTHRITIS INVOLVING MULTIPLE SITES WITH POSITIVE RHEUMATOID FACTOR (HCC): ICD-10-CM

## 2019-07-29 DIAGNOSIS — M06.30 RHEUMATOID NODULOSIS (HCC): ICD-10-CM

## 2019-07-29 RX ORDER — AZATHIOPRINE 50 MG/1
50 TABLET ORAL 2 TIMES DAILY
Qty: 60 TABLET | Refills: 0 | Status: SHIPPED | OUTPATIENT
Start: 2019-07-29 | End: 2019-08-28 | Stop reason: SDUPTHER

## 2019-08-27 ENCOUNTER — TELEPHONE (OUTPATIENT)
Dept: RHEUMATOLOGY | Age: 54
End: 2019-08-27

## 2019-08-27 DIAGNOSIS — M06.30 RHEUMATOID NODULOSIS (HCC): Primary | ICD-10-CM

## 2019-08-27 DIAGNOSIS — Z51.81 MEDICATION MONITORING ENCOUNTER: ICD-10-CM

## 2019-08-28 DIAGNOSIS — M06.30 RHEUMATOID NODULOSIS (HCC): ICD-10-CM

## 2019-08-28 DIAGNOSIS — M05.79 RHEUMATOID ARTHRITIS INVOLVING MULTIPLE SITES WITH POSITIVE RHEUMATOID FACTOR (HCC): ICD-10-CM

## 2019-08-28 LAB
A/G RATIO: 1.7 (ref 1.5–2.5)
ABSOLUTE BASO #: 0 /CMM (ref 0–200)
ABSOLUTE EOS #: 100 /CMM (ref 0–500)
ABSOLUTE LYMPH #: 1000 /CMM (ref 1000–4800)
ABSOLUTE MONO #: 500 /CMM (ref 0–800)
ABSOLUTE NEUT #: 4300 /CMM (ref 1800–7700)
ALBUMIN SERPL-MCNC: 4.1 GM/DL (ref 3.5–5)
ALP BLD-CCNC: 77 IU/L (ref 41–137)
ALT SERPL-CCNC: 10 IU/L (ref 10–40)
ANION GAP SERPL CALCULATED.3IONS-SCNC: 6 MMOL/L (ref 4–12)
AST SERPL-CCNC: 15 IU/L (ref 15–41)
BASOPHILS RELATIVE PERCENT: 0.6 % (ref 0–2)
BILIRUB SERPL-MCNC: 1.2 MG/DL (ref 0.2–1)
BUN BLDV-MCNC: 8 MG/DL (ref 7–20)
C-REACTIVE PROTEIN: < 0.5 MG/DL
CALCIUM SERPL-MCNC: 8.3 MG/DL (ref 8.8–10.5)
CHLORIDE BLD-SCNC: 109 MEQ/L (ref 101–111)
CO2: 25 MEQ/L (ref 21–32)
CREAT SERPL-MCNC: 0.77 MG/DL (ref 0.6–1.3)
CREATININE CLEARANCE: >60
EOSINOPHILS RELATIVE PERCENT: 1.9 % (ref 0–6)
GLUCOSE: 96 MG/DL (ref 70–110)
HCT VFR BLD CALC: 42.7 % (ref 40–49)
HEMOGLOBIN: 14.2 GM/DL (ref 13.5–16.5)
LYMPHOCYTES RELATIVE PERCENT: 16.7 % (ref 15–45)
MACROCYTOSIS: ABNORMAL
MCH RBC QN AUTO: 36.3 PG (ref 27.5–33)
MCHC RBC AUTO-ENTMCNC: 33.2 GM/DL (ref 33–36)
MCV RBC AUTO: 109.3 CU MIC (ref 80–97)
MONOCYTES RELATIVE PERCENT: 8 % (ref 2–10)
NEUTROPHILS RELATIVE PERCENT: 72.8 % (ref 40–70)
NUCLEATED RBCS: 0 /100 WBC
PDW BLD-RTO: 13.4 % (ref 12–16)
PLATELET # BLD: 260 TH/CMM (ref 150–400)
POTASSIUM SERPL-SCNC: 4.7 MEQ/L (ref 3.6–5)
RBC # BLD: 3.9 MIL/CMM (ref 4.5–6)
SEDIMENTATION RATE, ERYTHROCYTE: 3 MM/HR
SODIUM BLD-SCNC: 140 MEQ/L (ref 135–145)
TOTAL PROTEIN: 6.5 G/DL (ref 6.2–8)
WBC # BLD: 5.9 TH/CMM (ref 4.4–10.5)

## 2019-08-28 RX ORDER — FOLIC ACID 1 MG/1
2 TABLET ORAL DAILY
Qty: 180 TABLET | Refills: 1 | Status: SHIPPED | OUTPATIENT
Start: 2019-08-28 | End: 2020-01-22 | Stop reason: SDUPTHER

## 2019-08-28 RX ORDER — AZATHIOPRINE 50 MG/1
50 TABLET ORAL 2 TIMES DAILY
Qty: 60 TABLET | Refills: 1 | Status: SHIPPED | OUTPATIENT
Start: 2019-08-28 | End: 2019-11-29 | Stop reason: SDUPTHER

## 2019-08-29 ENCOUNTER — TELEPHONE (OUTPATIENT)
Dept: RHEUMATOLOGY | Age: 54
End: 2019-08-29

## 2019-08-29 NOTE — TELEPHONE ENCOUNTER
----- Message from Julia Cleaning DO sent at 8/28/2019  5:48 PM EDT -----  Labs w/ low calcemia . Please start an over-the-counter calcium supplement of 500-1000 mg daily.     Repeat blood test are needed in 8  week's

## 2019-09-23 DIAGNOSIS — R20.2 PARESTHESIA: ICD-10-CM

## 2019-09-23 DIAGNOSIS — I73.00 RAYNAUD'S DISEASE WITHOUT GANGRENE: ICD-10-CM

## 2019-09-23 RX ORDER — NIFEDIPINE 30 MG/1
TABLET, FILM COATED, EXTENDED RELEASE ORAL
Qty: 30 TABLET | Refills: 1 | Status: SHIPPED | OUTPATIENT
Start: 2019-09-23 | End: 2019-11-23 | Stop reason: SDUPTHER

## 2019-10-07 ENCOUNTER — OFFICE VISIT (OUTPATIENT)
Dept: RHEUMATOLOGY | Age: 54
End: 2019-10-07
Payer: COMMERCIAL

## 2019-10-07 ENCOUNTER — NURSE ONLY (OUTPATIENT)
Dept: LAB | Age: 54
End: 2019-10-07

## 2019-10-07 VITALS
BODY MASS INDEX: 24.96 KG/M2 | HEART RATE: 70 BPM | OXYGEN SATURATION: 98 % | DIASTOLIC BLOOD PRESSURE: 80 MMHG | WEIGHT: 159 LBS | SYSTOLIC BLOOD PRESSURE: 130 MMHG | HEIGHT: 67 IN

## 2019-10-07 DIAGNOSIS — M05.79 RHEUMATOID ARTHRITIS INVOLVING MULTIPLE SITES WITH POSITIVE RHEUMATOID FACTOR (HCC): ICD-10-CM

## 2019-10-07 DIAGNOSIS — F17.219 CIGARETTE NICOTINE DEPENDENCE WITH NICOTINE-INDUCED DISORDER: ICD-10-CM

## 2019-10-07 DIAGNOSIS — Z51.81 MEDICATION MONITORING ENCOUNTER: ICD-10-CM

## 2019-10-07 DIAGNOSIS — I73.00 RAYNAUD'S DISEASE WITHOUT GANGRENE: ICD-10-CM

## 2019-10-07 DIAGNOSIS — M19.042 OSTEOARTHRITIS OF BOTH HANDS, UNSPECIFIED OSTEOARTHRITIS TYPE: ICD-10-CM

## 2019-10-07 DIAGNOSIS — M19.041 OSTEOARTHRITIS OF BOTH HANDS, UNSPECIFIED OSTEOARTHRITIS TYPE: ICD-10-CM

## 2019-10-07 DIAGNOSIS — M06.30 RHEUMATOID NODULOSIS (HCC): ICD-10-CM

## 2019-10-07 DIAGNOSIS — R21 RASH: ICD-10-CM

## 2019-10-07 DIAGNOSIS — M05.79 RHEUMATOID ARTHRITIS INVOLVING MULTIPLE SITES WITH POSITIVE RHEUMATOID FACTOR (HCC): Primary | ICD-10-CM

## 2019-10-07 LAB
ALBUMIN SERPL-MCNC: 4.8 G/DL (ref 3.5–5.1)
ALP BLD-CCNC: 95 U/L (ref 38–126)
ALT SERPL-CCNC: 15 U/L (ref 11–66)
ANION GAP SERPL CALCULATED.3IONS-SCNC: 15 MEQ/L (ref 8–16)
AST SERPL-CCNC: 17 U/L (ref 5–40)
BASOPHILS # BLD: 0.6 %
BASOPHILS ABSOLUTE: 0 THOU/MM3 (ref 0–0.1)
BILIRUB SERPL-MCNC: 1.1 MG/DL (ref 0.3–1.2)
BUN BLDV-MCNC: 8 MG/DL (ref 7–22)
C-REACTIVE PROTEIN: 1.28 MG/DL (ref 0–1)
CALCIUM SERPL-MCNC: 9.6 MG/DL (ref 8.5–10.5)
CHLORIDE BLD-SCNC: 102 MEQ/L (ref 98–111)
CO2: 24 MEQ/L (ref 23–33)
CREAT SERPL-MCNC: 0.7 MG/DL (ref 0.4–1.2)
EOSINOPHIL # BLD: 1.4 %
EOSINOPHILS ABSOLUTE: 0.1 THOU/MM3 (ref 0–0.4)
ERYTHROCYTE [DISTWIDTH] IN BLOOD BY AUTOMATED COUNT: 12.2 % (ref 11.5–14.5)
ERYTHROCYTE [DISTWIDTH] IN BLOOD BY AUTOMATED COUNT: 50.8 FL (ref 35–45)
GFR SERPL CREATININE-BSD FRML MDRD: > 90 ML/MIN/1.73M2
GLUCOSE BLD-MCNC: 109 MG/DL (ref 70–108)
HCT VFR BLD CALC: 46.6 % (ref 42–52)
HEMOGLOBIN: 15 GM/DL (ref 14–18)
IMMATURE GRANS (ABS): 0.02 THOU/MM3 (ref 0–0.07)
IMMATURE GRANULOCYTES: 0.3 %
LYMPHOCYTES # BLD: 16.5 %
LYMPHOCYTES ABSOLUTE: 1.2 THOU/MM3 (ref 1–4.8)
MACROCYTES: PRESENT
MCH RBC QN AUTO: 36.1 PG (ref 26–33)
MCHC RBC AUTO-ENTMCNC: 32.2 GM/DL (ref 32.2–35.5)
MCV RBC AUTO: 112 FL (ref 80–94)
MONOCYTES # BLD: 7.6 %
MONOCYTES ABSOLUTE: 0.6 THOU/MM3 (ref 0.4–1.3)
NUCLEATED RED BLOOD CELLS: 0 /100 WBC
PLATELET # BLD: 265 THOU/MM3 (ref 130–400)
PMV BLD AUTO: 8.5 FL (ref 9.4–12.4)
POTASSIUM SERPL-SCNC: 4.2 MEQ/L (ref 3.5–5.2)
RBC # BLD: 4.16 MILL/MM3 (ref 4.7–6.1)
SEDIMENTATION RATE, ERYTHROCYTE: 12 MM/HR (ref 0–10)
SEG NEUTROPHILS: 73.6 %
SEGMENTED NEUTROPHILS ABSOLUTE COUNT: 5.4 THOU/MM3 (ref 1.8–7.7)
SODIUM BLD-SCNC: 141 MEQ/L (ref 135–145)
TOTAL PROTEIN: 7.8 G/DL (ref 6.1–8)
WBC # BLD: 7.3 THOU/MM3 (ref 4.8–10.8)

## 2019-10-07 PROCEDURE — G8427 DOCREV CUR MEDS BY ELIG CLIN: HCPCS | Performed by: INTERNAL MEDICINE

## 2019-10-07 PROCEDURE — G8420 CALC BMI NORM PARAMETERS: HCPCS | Performed by: INTERNAL MEDICINE

## 2019-10-07 PROCEDURE — 3017F COLORECTAL CA SCREEN DOC REV: CPT | Performed by: INTERNAL MEDICINE

## 2019-10-07 PROCEDURE — 4004F PT TOBACCO SCREEN RCVD TLK: CPT | Performed by: INTERNAL MEDICINE

## 2019-10-07 PROCEDURE — G8484 FLU IMMUNIZE NO ADMIN: HCPCS | Performed by: INTERNAL MEDICINE

## 2019-10-07 PROCEDURE — 99214 OFFICE O/P EST MOD 30 MIN: CPT | Performed by: INTERNAL MEDICINE

## 2019-10-07 RX ORDER — TRIAMCINOLONE ACETONIDE 0.25 MG/G
OINTMENT TOPICAL
Qty: 80 G | Refills: 1 | Status: SHIPPED | OUTPATIENT
Start: 2019-10-07 | End: 2019-10-14

## 2019-10-07 ASSESSMENT — ENCOUNTER SYMPTOMS
EYES NEGATIVE: 1
EYE PAIN: 0
NAUSEA: 0
CONSTIPATION: 0
VOMITING: 0
DIARRHEA: 0
BLOOD IN STOOL: 0
EYE REDNESS: 0
RESPIRATORY NEGATIVE: 1

## 2019-10-08 ENCOUNTER — TELEPHONE (OUTPATIENT)
Dept: RHEUMATOLOGY | Age: 54
End: 2019-10-08

## 2019-10-10 LAB
QUANTI TB GOLD PLUS: NEGATIVE
QUANTI TB1 MINUS NIL: 0 IU/ML (ref 0–0.34)
QUANTI TB2 MINUS NIL: 0 IU/ML (ref 0–0.34)
QUANTIFERON MITOGEN MINUS NIL: 7.2 IU/ML
QUANTIFERON NIL: 0.01 IU/ML

## 2019-10-11 DIAGNOSIS — Z51.81 MEDICATION MONITORING ENCOUNTER: ICD-10-CM

## 2019-10-11 DIAGNOSIS — M05.79 RHEUMATOID ARTHRITIS INVOLVING MULTIPLE SITES WITH POSITIVE RHEUMATOID FACTOR (HCC): Primary | ICD-10-CM

## 2019-10-14 ENCOUNTER — TELEPHONE (OUTPATIENT)
Dept: RHEUMATOLOGY | Age: 54
End: 2019-10-14

## 2019-10-15 ENCOUNTER — TELEPHONE (OUTPATIENT)
Dept: RHEUMATOLOGY | Age: 54
End: 2019-10-15

## 2019-11-01 DIAGNOSIS — M05.79 RHEUMATOID ARTHRITIS INVOLVING MULTIPLE SITES WITH POSITIVE RHEUMATOID FACTOR (HCC): ICD-10-CM

## 2019-11-01 DIAGNOSIS — Z51.81 MEDICATION MONITORING ENCOUNTER: ICD-10-CM

## 2019-11-23 DIAGNOSIS — R20.2 PARESTHESIA: ICD-10-CM

## 2019-11-23 DIAGNOSIS — I73.00 RAYNAUD'S DISEASE WITHOUT GANGRENE: ICD-10-CM

## 2019-11-25 RX ORDER — NIFEDIPINE 30 MG/1
TABLET, FILM COATED, EXTENDED RELEASE ORAL
Qty: 30 TABLET | Refills: 0 | Status: SHIPPED | OUTPATIENT
Start: 2019-11-25 | End: 2019-12-31

## 2019-11-27 LAB
A/G RATIO: 2 (ref 1.5–2.5)
ABSOLUTE BASO #: 0 /CMM (ref 0–200)
ABSOLUTE EOS #: 100 /CMM (ref 0–500)
ABSOLUTE LYMPH #: 1100 /CMM (ref 1000–4800)
ABSOLUTE MONO #: 500 /CMM (ref 0–800)
ABSOLUTE NEUT #: 3500 /CMM (ref 1800–7700)
ALBUMIN SERPL-MCNC: 4 GM/DL (ref 3.5–5)
ALP BLD-CCNC: 78 IU/L (ref 41–137)
ALT SERPL-CCNC: 18 IU/L (ref 10–40)
ANION GAP SERPL CALCULATED.3IONS-SCNC: 5 MMOL/L (ref 4–12)
AST SERPL-CCNC: 15 IU/L (ref 15–41)
BASOPHILS RELATIVE PERCENT: 0.6 % (ref 0–2)
BILIRUB SERPL-MCNC: 1.4 MG/DL (ref 0.2–1)
BUN BLDV-MCNC: 11 MG/DL (ref 7–20)
C-REACTIVE PROTEIN: < 0.5 MG/DL
CALCIUM SERPL-MCNC: 8.9 MG/DL (ref 8.8–10.5)
CHLORIDE BLD-SCNC: 110 MEQ/L (ref 101–111)
CO2: 27 MEQ/L (ref 21–32)
CREAT SERPL-MCNC: 0.8 MG/DL (ref 0.6–1.3)
CREATININE CLEARANCE: >60
EOSINOPHILS RELATIVE PERCENT: 1.4 % (ref 0–6)
GLUCOSE: 84 MG/DL (ref 70–110)
HCT VFR BLD CALC: 40.1 % (ref 40–49)
HEMOGLOBIN: 13 GM/DL (ref 13.5–16.5)
LYMPHOCYTES RELATIVE PERCENT: 21.2 % (ref 15–45)
MACROCYTOSIS: ABNORMAL
MCH RBC QN AUTO: 36.7 PG (ref 27.5–33)
MCHC RBC AUTO-ENTMCNC: 32.6 GM/DL (ref 33–36)
MCV RBC AUTO: 112.8 CU MIC (ref 80–97)
MONOCYTES RELATIVE PERCENT: 9.8 % (ref 2–10)
NEUTROPHILS RELATIVE PERCENT: 67 % (ref 40–70)
NUCLEATED RBCS: 0.1 /100 WBC
PDW BLD-RTO: 14.5 % (ref 12–16)
PLATELET # BLD: 275 TH/CMM (ref 150–400)
POTASSIUM SERPL-SCNC: 4.2 MEQ/L (ref 3.6–5)
RBC # BLD: 3.55 MIL/CMM (ref 4.5–6)
SEDIMENTATION RATE, ERYTHROCYTE: 4 MM/HR
SODIUM BLD-SCNC: 142 MEQ/L (ref 135–145)
TOTAL PROTEIN: 6 G/DL (ref 6.2–8)
WBC # BLD: 5.3 TH/CMM (ref 4.4–10.5)

## 2019-11-29 DIAGNOSIS — M06.30 RHEUMATOID NODULOSIS (HCC): ICD-10-CM

## 2019-11-29 DIAGNOSIS — M05.79 RHEUMATOID ARTHRITIS INVOLVING MULTIPLE SITES WITH POSITIVE RHEUMATOID FACTOR (HCC): ICD-10-CM

## 2019-11-29 RX ORDER — AZATHIOPRINE 50 MG/1
50 TABLET ORAL 2 TIMES DAILY
Qty: 60 TABLET | Refills: 1 | Status: SHIPPED | OUTPATIENT
Start: 2019-11-29 | End: 2020-02-14 | Stop reason: ALTCHOICE

## 2019-12-03 DIAGNOSIS — M05.79 RHEUMATOID ARTHRITIS INVOLVING MULTIPLE SITES WITH POSITIVE RHEUMATOID FACTOR (HCC): ICD-10-CM

## 2019-12-03 DIAGNOSIS — M06.30 RHEUMATOID NODULOSIS (HCC): ICD-10-CM

## 2019-12-09 ENCOUNTER — TELEPHONE (OUTPATIENT)
Dept: PHYSICAL MEDICINE AND REHAB | Age: 54
End: 2019-12-09

## 2019-12-09 DIAGNOSIS — M05.79 RHEUMATOID ARTHRITIS INVOLVING MULTIPLE SITES WITH POSITIVE RHEUMATOID FACTOR (HCC): ICD-10-CM

## 2019-12-09 DIAGNOSIS — Z51.81 MEDICATION MONITORING ENCOUNTER: ICD-10-CM

## 2019-12-23 DIAGNOSIS — M05.79 RHEUMATOID ARTHRITIS INVOLVING MULTIPLE SITES WITH POSITIVE RHEUMATOID FACTOR (HCC): ICD-10-CM

## 2019-12-23 RX ORDER — MELOXICAM 7.5 MG/1
7.5 TABLET ORAL DAILY
Qty: 30 TABLET | Refills: 2 | Status: SHIPPED | OUTPATIENT
Start: 2019-12-23 | End: 2020-03-02 | Stop reason: SDUPTHER

## 2019-12-30 DIAGNOSIS — I73.00 RAYNAUD'S DISEASE WITHOUT GANGRENE: ICD-10-CM

## 2019-12-30 DIAGNOSIS — R20.2 PARESTHESIA: ICD-10-CM

## 2019-12-31 RX ORDER — NIFEDIPINE 30 MG/1
TABLET, FILM COATED, EXTENDED RELEASE ORAL
Qty: 30 TABLET | Refills: 5 | Status: SHIPPED | OUTPATIENT
Start: 2019-12-31

## 2020-01-20 LAB
A/G RATIO: 1.8 (ref 1.5–2.5)
ABSOLUTE BASO #: 0 /CMM (ref 0–200)
ABSOLUTE EOS #: 100 /CMM (ref 0–500)
ABSOLUTE LYMPH #: 1100 /CMM (ref 1000–4800)
ABSOLUTE MONO #: 600 /CMM (ref 0–800)
ABSOLUTE NEUT #: 5200 /CMM (ref 1800–7700)
ALBUMIN SERPL-MCNC: 4.4 GM/DL (ref 3.5–5)
ALP BLD-CCNC: 90 IU/L (ref 41–137)
ALT SERPL-CCNC: 26 IU/L (ref 10–40)
ANION GAP SERPL CALCULATED.3IONS-SCNC: 3 MMOL/L (ref 4–12)
AST SERPL-CCNC: 16 IU/L (ref 15–41)
BASOPHILS RELATIVE PERCENT: 0.4 % (ref 0–2)
BILIRUB SERPL-MCNC: 1.1 MG/DL (ref 0.2–1)
BUN BLDV-MCNC: 11 MG/DL (ref 7–20)
C-REACTIVE PROTEIN: 0.5 MG/DL
CALCIUM SERPL-MCNC: 9.3 MG/DL (ref 8.8–10.5)
CHLORIDE BLD-SCNC: 106 MEQ/L (ref 101–111)
CO2: 29 MEQ/L (ref 21–32)
CREAT SERPL-MCNC: 0.81 MG/DL (ref 0.6–1.3)
CREATININE CLEARANCE: >60
EOSINOPHILS RELATIVE PERCENT: 1.2 % (ref 0–6)
GLUCOSE: 103 MG/DL (ref 70–110)
HCT VFR BLD CALC: 43.4 % (ref 40–49)
HEMOGLOBIN: 14.3 GM/DL (ref 13.5–16.5)
LYMPHOCYTES RELATIVE PERCENT: 15.7 % (ref 15–45)
MACROCYTOSIS: ABNORMAL
MCH RBC QN AUTO: 36.8 PG (ref 27.5–33)
MCHC RBC AUTO-ENTMCNC: 33 GM/DL (ref 33–36)
MCV RBC AUTO: 111.4 CU MIC (ref 80–97)
MONOCYTES RELATIVE PERCENT: 8.7 % (ref 2–10)
NEUTROPHILS RELATIVE PERCENT: 74 % (ref 40–70)
NUCLEATED RBCS: 0.1 /100 WBC
PDW BLD-RTO: 14.2 % (ref 12–16)
PLATELET # BLD: 274 TH/CMM (ref 150–400)
POTASSIUM SERPL-SCNC: 4.2 MEQ/L (ref 3.6–5)
RBC # BLD: 3.9 MIL/CMM (ref 4.5–6)
SEDIMENTATION RATE, ERYTHROCYTE: 70 MM/HR
SODIUM BLD-SCNC: 138 MEQ/L (ref 135–145)
TOTAL PROTEIN: 6.9 G/DL (ref 6.2–8)
WBC # BLD: 7 TH/CMM (ref 4.4–10.5)

## 2020-01-21 ENCOUNTER — TELEPHONE (OUTPATIENT)
Dept: RHEUMATOLOGY | Age: 55
End: 2020-01-21

## 2020-01-21 NOTE — TELEPHONE ENCOUNTER
RA -- continued activity, joint pain and swelling   xeljanz not started b/c insurance coverage. Will discussed with pt t f/u tomorrow.

## 2020-01-22 ENCOUNTER — OFFICE VISIT (OUTPATIENT)
Dept: RHEUMATOLOGY | Age: 55
End: 2020-01-22
Payer: COMMERCIAL

## 2020-01-22 VITALS
WEIGHT: 167.6 LBS | OXYGEN SATURATION: 98 % | HEART RATE: 71 BPM | DIASTOLIC BLOOD PRESSURE: 80 MMHG | SYSTOLIC BLOOD PRESSURE: 124 MMHG | HEIGHT: 67 IN | BODY MASS INDEX: 26.3 KG/M2

## 2020-01-22 PROCEDURE — G8427 DOCREV CUR MEDS BY ELIG CLIN: HCPCS | Performed by: INTERNAL MEDICINE

## 2020-01-22 PROCEDURE — 99214 OFFICE O/P EST MOD 30 MIN: CPT | Performed by: INTERNAL MEDICINE

## 2020-01-22 PROCEDURE — 3017F COLORECTAL CA SCREEN DOC REV: CPT | Performed by: INTERNAL MEDICINE

## 2020-01-22 PROCEDURE — 4004F PT TOBACCO SCREEN RCVD TLK: CPT | Performed by: INTERNAL MEDICINE

## 2020-01-22 PROCEDURE — G8484 FLU IMMUNIZE NO ADMIN: HCPCS | Performed by: INTERNAL MEDICINE

## 2020-01-22 PROCEDURE — G8419 CALC BMI OUT NRM PARAM NOF/U: HCPCS | Performed by: INTERNAL MEDICINE

## 2020-01-22 RX ORDER — FOLIC ACID 1 MG/1
2 TABLET ORAL DAILY
Qty: 180 TABLET | Refills: 1 | Status: SHIPPED | OUTPATIENT
Start: 2020-01-22 | End: 2020-03-17

## 2020-01-22 RX ORDER — LOSARTAN POTASSIUM 100 MG/1
TABLET ORAL
COMMUNITY
Start: 2020-01-17

## 2020-01-22 ASSESSMENT — ENCOUNTER SYMPTOMS
EYE REDNESS: 0
EYE PAIN: 0
DIARRHEA: 0
COUGH: 1
BLOOD IN STOOL: 0
VOMITING: 0
EYES NEGATIVE: 1
SHORTNESS OF BREATH: 1
NAUSEA: 0
CONSTIPATION: 0

## 2020-01-22 NOTE — PROGRESS NOTES
[id number] [facility] f/u note       Date Of Service: 1/22/2020  Provider: Kiersten Witt DO    Name: Brandan Lopez   MRN: [id number]    Chief Complaint(s)      Chief Complaint   Patient presents with    3 Month Follow-Up     Rheumatoid Arthritis/Raynauds        History of Present illness (HPI)    Brandan Lopez   is a(n)54 y.o. male with a hx of Rheumatoid arthritis w/ nodulosis here for the f/u evaluation of his rheumatoid arthritis. procarida stopped & losartan added by PCP. Rheumatoid arthritis- continued flares of the rheumatoid. B/l  wrists, hands/fingers. Pain up to 6/10. the past week. Denies AM stiffness. Timing - evenings. Denies am stiffness or joint swelling. Rheumatoid nodulosis -- improved since starting DMARD's MTX + azathioprine. Trigger fingers: Active  triggering of left fingers Raynauds -- ACTIVE. W/ continued color changes, paresthesia of hands bilat. Fingertips. Tobacco dependence: continued 1/2 ppd.     + RUANO , occurring with walking up a flight of stairs. + Orthopenia, mild prodcutive cough mainly in AM. Bronchitis about 1-2 montsh ago w/ worsening symptoms since this time. Denies lower ext swelling , hemoptysis, platypnea. Current therapy:   - mobic 7.5mg daily prn.  - methotrexate 25 mg po weekly (2017 -->25 mg 1/22/18)   - imuran 50mg bid (June 2019)   - Priocardia 30mg daily     Previous therapy:  - plaquenil (? Relief)   -sulfasalazine (gi upset)   - arava 10mg daily (may 2018-Sept 2018 LFT elevation )       Review of Systems    Review of Systems   Constitutional: Positive for fatigue. Negative for fever. HENT: Negative for congestion, hearing loss and nosebleeds. Eyes: Negative. Negative for pain and redness. Respiratory: Positive for cough (in mornings) and shortness of breath. Cardiovascular: Negative. Negative for chest pain and leg swelling. Gastrointestinal: Negative for blood in stool, constipation, diarrhea, nausea and vomiting. Genitourinary: Negative for hematuria. Musculoskeletal: Negative for myalgias. Skin: Negative for rash. Neurological: Negative for dizziness, weakness and headaches. Psychiatric/Behavioral: The patient is not nervous/anxious. PAST MEDICAL HISTORY      Past Medical History:   Diagnosis Date    Rheumatoid arthritis ([address]        PAST SURGICAL HISTORY      Past Surgical History:   Procedure Laterality Date    ROTATOR CUFF REPAIR Right     shoulder       FAMILY HISTORY      Family History   Problem Relation Age of Onset    High Blood Pressure Mother     Kidney Disease Father        SOCIAL HISTORY      Social History     Tobacco History     Smoking Status  Current Every Day Smoker Smoking Frequency  0.5 packs/day for 35 years (17.5 pk yrs) Smoking Tobacco Type  Cigarettes    Smokeless Tobacco Use  Never Used          Alcohol History     Alcohol Use Status  No          Drug Use     Drug Use Status  No          Sexual Activity     Sexually Active  Not Asked                ALLERGIES   No Known Allergies    CURRENT MEDICATIONS      Current Outpatient Medications   Medication Sig Dispense Refill    losartan (COZAAR) 100 MG tablet       NIFEdipine (ADALAT CC) 30 MG extended release tablet take 1 tablet by mouth once daily 30 tablet 5    meloxicam (MOBIC) 7.5 MG tablet Take 1 tablet by mouth daily 30 tablet 2    Tofacitinib Citrate (XELJANZ XR) 11 MG TB24 Take 11 mg by mouth daily 30 tablet 3    methotrexate (RHEUMATREX) 2.5 MG chemo tablet Take 8 tablets by mouth every 7 days Take 4 tablets in the morning and 4 tablets in the evening once weekly 40 tablet 1    azaTHIOprine (IMURAN) 50 MG tablet Take 1 tablet by mouth 2 times daily 60 tablet 1    folic acid (FOLVITE) 1 MG tablet Take 2 tablets by mouth daily 180 tablet 1    nicotine (NICODERM CQ) 21 MG/24HR Place 1 patch onto the skin every 24 hours 30 patch 3     No current facility-administered medications for this visit.         PHYSICAL EXAMINATION / OBJECTIVE     Objective:  /80 (Site: Left Upper Arm, Position: Sitting, Cuff Size: Medium Adult)   Pulse 71   Ht 5' 7.01\" (1.702 m)   Wt 167 lb 9.6 oz (76 kg)   SpO2 98%   BMI 26.24 kg/m²     Physical Exam  Vitals signs reviewed. Constitutional:       General: He is not in acute distress. Appearance: He is well-developed. He is not diaphoretic. HENT:      Head: Normocephalic and atraumatic. Right Ear: External ear normal.      Left Ear: External ear normal.   Eyes:      Conjunctiva/sclera: Conjunctivae normal.      Pupils: Pupils are equal, round, and reactive to light. Neck:      Musculoskeletal: Neck supple. Cardiovascular:      Rate and Rhythm: Normal rate and regular rhythm. Heart sounds: Normal heart sounds. Pulmonary:      Effort: Pulmonary effort is normal.      Breath sounds: Normal breath sounds. Musculoskeletal: Normal range of motion. Lymphadenopathy:      Cervical: No cervical adenopathy. Skin:     General: Skin is warm and dry. Comments: Red plaques bilateral dorsal hands. Onycholysis bilat toenails. Neurological:      Mental Status: He is alert and oriented to person, place, and time. Muscle strength 5/5, FROM, No Synovitis   Shoulder: non-tender, no swelling, intact ROm   Elbows: non-tender, no swelling, intact ROM   - Nodules  B/l olecranon process, mid-forearm  Wrist: non-tender, no synovitis. Fingers: MCPs: tender bilat 2-5. Swelling/boggy - left MCPs 2,3 Right 4. PIPs tender bilat 2-5    DIPs: non-tender,   -  nodules at Lft palmar thum and dorsal Rt 5th MCP. Lower Extremities:   Muscle strength 5/5, FROM, No Synovitis          Picture obtained after verbal consent obtained. Physical Exam      MDHAQ not filled out by pt.      Remission: <3  Low Disease Activity: <6  Moderate Disease Activity: >=6 and <=12  High Disease Activity: >12    LABS        CBC  Lab Results   Component Value Date    WBC 7.0

## 2020-01-27 ENCOUNTER — TELEPHONE (OUTPATIENT)
Dept: PHYSICAL MEDICINE AND REHAB | Age: 55
End: 2020-01-27

## 2020-01-27 NOTE — TELEPHONE ENCOUNTER
Your office note state MTX 25 mg weekly but was was sent was 8 tabs per week=20 mg weekly. Can you please clarify.

## 2020-02-13 LAB
A/G RATIO: 1.6 (ref 1.5–2.5)
ABSOLUTE BASO #: 0 /CMM (ref 0–200)
ABSOLUTE EOS #: 100 /CMM (ref 0–500)
ABSOLUTE LYMPH #: 1000 /CMM (ref 1000–4800)
ABSOLUTE MONO #: 700 /CMM (ref 0–800)
ABSOLUTE NEUT #: 5600 /CMM (ref 1800–7700)
ALBUMIN SERPL-MCNC: 4.2 GM/DL (ref 3.5–5)
ALP BLD-CCNC: 94 IU/L (ref 41–137)
ALT SERPL-CCNC: 20 IU/L (ref 10–40)
ANION GAP SERPL CALCULATED.3IONS-SCNC: 3 MMOL/L (ref 4–12)
ANISOCYTOSIS: ABNORMAL
AST SERPL-CCNC: 15 IU/L (ref 15–41)
BASOPHILS RELATIVE PERCENT: 0.4 % (ref 0–2)
BILIRUB SERPL-MCNC: 1.1 MG/DL (ref 0.2–1)
BUN BLDV-MCNC: 13 MG/DL (ref 7–20)
C-REACTIVE PROTEIN: < 0.5 MG/DL
CALCIUM SERPL-MCNC: 9.1 MG/DL (ref 8.8–10.5)
CHLORIDE BLD-SCNC: 110 MEQ/L (ref 101–111)
CO2: 27 MEQ/L (ref 21–32)
CREAT SERPL-MCNC: 0.81 MG/DL (ref 0.6–1.3)
CREATININE CLEARANCE: >60
EOSINOPHILS RELATIVE PERCENT: 1.1 % (ref 0–6)
GLUCOSE: 103 MG/DL (ref 70–110)
HCT VFR BLD CALC: 42.2 % (ref 40–49)
HEMOGLOBIN: 14.2 GM/DL (ref 13.5–16.5)
LYMPHOCYTES RELATIVE PERCENT: 14 % (ref 15–45)
MACROCYTOSIS: ABNORMAL
MCH RBC QN AUTO: 37.5 PG (ref 27.5–33)
MCHC RBC AUTO-ENTMCNC: 33.7 GM/DL (ref 33–36)
MCV RBC AUTO: 111.4 CU MIC (ref 80–97)
MONOCYTES RELATIVE PERCENT: 9.4 % (ref 2–10)
NEUTROPHILS RELATIVE PERCENT: 75.1 % (ref 40–70)
NUCLEATED RBCS: 0 /100 WBC
PDW BLD-RTO: 14.6 % (ref 12–16)
PLATELET # BLD: 266 TH/CMM (ref 150–400)
POTASSIUM SERPL-SCNC: 4.7 MEQ/L (ref 3.6–5)
RBC # BLD: 3.79 MIL/CMM (ref 4.5–6)
SEDIMENTATION RATE, ERYTHROCYTE: 2 MM/HR
SODIUM BLD-SCNC: 140 MEQ/L (ref 135–145)
TOTAL PROTEIN: 6.9 G/DL (ref 6.2–8)
WBC # BLD: 7.5 TH/CMM (ref 4.4–10.5)

## 2020-02-19 ENCOUNTER — TELEPHONE (OUTPATIENT)
Dept: PHYSICAL MEDICINE AND REHAB | Age: 55
End: 2020-02-19

## 2020-03-02 RX ORDER — MELOXICAM 7.5 MG/1
7.5 TABLET ORAL DAILY
Qty: 30 TABLET | Refills: 2 | Status: SHIPPED | OUTPATIENT
Start: 2020-03-02 | End: 2020-05-26

## 2020-03-16 LAB
A/G RATIO: 1.8 (ref 1.5–2.5)
ABSOLUTE BASO #: 0 /CMM (ref 0–200)
ABSOLUTE EOS #: 100 /CMM (ref 0–500)
ABSOLUTE LYMPH #: 1300 /CMM (ref 1000–4800)
ABSOLUTE MONO #: 500 /CMM (ref 0–800)
ABSOLUTE NEUT #: 4900 /CMM (ref 1800–7700)
ALBUMIN SERPL-MCNC: 4.4 GM/DL (ref 3.5–5)
ALP BLD-CCNC: 87 IU/L (ref 41–137)
ALT SERPL-CCNC: 30 IU/L (ref 10–40)
ANION GAP SERPL CALCULATED.3IONS-SCNC: 3 MMOL/L (ref 4–12)
AST SERPL-CCNC: 18 IU/L (ref 15–41)
BASOPHILS RELATIVE PERCENT: 0.5 % (ref 0–2)
BILIRUB SERPL-MCNC: 1 MG/DL (ref 0.2–1)
BUN BLDV-MCNC: 12 MG/DL (ref 7–20)
C-REACTIVE PROTEIN: < 0.5 MG/DL
CALCIUM SERPL-MCNC: 9.3 MG/DL (ref 8.8–10.5)
CHLORIDE BLD-SCNC: 108 MEQ/L (ref 101–111)
CO2: 29 MEQ/L (ref 21–32)
CREAT SERPL-MCNC: 0.87 MG/DL (ref 0.6–1.3)
CREATININE CLEARANCE: >60
EOSINOPHILS RELATIVE PERCENT: 1.2 % (ref 0–6)
GLUCOSE: 97 MG/DL (ref 70–110)
HCT VFR BLD CALC: 42.6 % (ref 40–49)
HEMOGLOBIN: 14.1 GM/DL (ref 13.5–16.5)
LYMPHOCYTES RELATIVE PERCENT: 18.8 % (ref 15–45)
MACROCYTOSIS: ABNORMAL
MCH RBC QN AUTO: 36 PG (ref 27.5–33)
MCHC RBC AUTO-ENTMCNC: 33.1 GM/DL (ref 33–36)
MCV RBC AUTO: 108.8 CU MIC (ref 80–97)
MONOCYTES RELATIVE PERCENT: 8 % (ref 2–10)
NEUTROPHILS RELATIVE PERCENT: 71.5 % (ref 40–70)
NUCLEATED RBCS: 0 /100 WBC
PDW BLD-RTO: 13.6 % (ref 12–16)
PLATELET # BLD: 299 TH/CMM (ref 150–400)
POTASSIUM SERPL-SCNC: 4.7 MEQ/L (ref 3.6–5)
RBC # BLD: 3.91 MIL/CMM (ref 4.5–6)
SEDIMENTATION RATE, ERYTHROCYTE: 3 MM/HR
SODIUM BLD-SCNC: 140 MEQ/L (ref 135–145)
TOTAL PROTEIN: 6.8 G/DL (ref 6.2–8)
WBC # BLD: 6.8 TH/CMM (ref 4.4–10.5)

## 2020-03-17 ENCOUNTER — OFFICE VISIT (OUTPATIENT)
Dept: RHEUMATOLOGY | Age: 55
End: 2020-03-17
Payer: COMMERCIAL

## 2020-03-17 VITALS
OXYGEN SATURATION: 97 % | HEIGHT: 67 IN | BODY MASS INDEX: 27.04 KG/M2 | WEIGHT: 172.3 LBS | HEART RATE: 77 BPM | SYSTOLIC BLOOD PRESSURE: 130 MMHG | DIASTOLIC BLOOD PRESSURE: 80 MMHG

## 2020-03-17 PROCEDURE — 3017F COLORECTAL CA SCREEN DOC REV: CPT | Performed by: NURSE PRACTITIONER

## 2020-03-17 PROCEDURE — G8419 CALC BMI OUT NRM PARAM NOF/U: HCPCS | Performed by: NURSE PRACTITIONER

## 2020-03-17 PROCEDURE — G8427 DOCREV CUR MEDS BY ELIG CLIN: HCPCS | Performed by: NURSE PRACTITIONER

## 2020-03-17 PROCEDURE — G8484 FLU IMMUNIZE NO ADMIN: HCPCS | Performed by: NURSE PRACTITIONER

## 2020-03-17 PROCEDURE — 4004F PT TOBACCO SCREEN RCVD TLK: CPT | Performed by: NURSE PRACTITIONER

## 2020-03-17 PROCEDURE — 99214 OFFICE O/P EST MOD 30 MIN: CPT | Performed by: NURSE PRACTITIONER

## 2020-03-17 RX ORDER — FOLIC ACID 1 MG/1
TABLET ORAL
Qty: 120 TABLET | Refills: 0 | Status: SHIPPED | OUTPATIENT
Start: 2020-03-17 | End: 2020-04-16 | Stop reason: SDUPTHER

## 2020-03-17 ASSESSMENT — ENCOUNTER SYMPTOMS
NAUSEA: 0
COUGH: 0
EYE PAIN: 0
CONSTIPATION: 0
DIARRHEA: 0
ABDOMINAL PAIN: 0
SHORTNESS OF BREATH: 0
TROUBLE SWALLOWING: 0
EYE ITCHING: 0
BACK PAIN: 0

## 2020-03-17 NOTE — PROGRESS NOTES
[facility] RHEUMATOLOGY FOLLOW UP NOTE       Date Of Service: 3/17/2020  Provider: GUY Burns - CNP    Name: Cresencio Haile   MRN: [id number]    Chief Complaint(s)     Chief Complaint   Patient presents with    Follow-up     2 months Rheumatoid Arthritis        History of Present Illness (HPI)     Cresencio Haile  is a(n)55 y.o. male with a hx of rheumatoid arthritis w/ nodulris here for the f/u evaluation of rheumatoid arthritis     Interval hx:    - started [place] 2 months ago- tolerating well     Denies any pain or flare up since last visit    Associated symptoms:  denies swelling/  Redness/ warmth, denies AM stiffness    Raynauds active with cold weather. REVIEW OF SYSTEMS: (ROS)    Review of Systems   Constitutional: Negative for fatigue, fever and unexpected weight change. HENT: Negative for congestion and trouble swallowing. Eyes: Negative for pain and itching. Respiratory: Negative for cough and shortness of breath. Cardiovascular: Negative for chest pain and leg swelling. Gastrointestinal: Negative for abdominal pain, constipation, diarrhea and nausea. Endocrine: Negative for cold intolerance and heat intolerance. Genitourinary: Negative for difficulty urinating, frequency and urgency. Musculoskeletal: Negative for arthralgias, back pain and joint swelling. Skin: Negative for rash. Neurological: Negative for dizziness, weakness, numbness and headaches. Psychiatric/Behavioral: The patient is not nervous/anxious.         PAST MEDICAL HISTORY      Past Medical History:   Diagnosis Date    Rheumatoid arthritis ([address]        PAST SURGICAL HISTORY      Past Surgical History:   Procedure Laterality Date    ROTATOR CUFF REPAIR Right     shoulder       FAMILY HISTORY      Family History   Problem Relation Age of Onset    High Blood Pressure Mother     Kidney Disease Father        SOCIAL HISTORY      Social History     Tobacco History     Smoking Status  Current Every for: SSB  No results found for: ANTI-SMITH  No results found for: DSDNAAB   No results found for: ANTIRNP  No results found for: C3, C4  No results found for: CCPAB  Lab Results   Component Value Date    RF >10 11/02/2017       No components found for: CANCASCRN, APANCASCRN  Lab Results   Component Value Date    SEDRATE 3 03/16/2020     Lab Results   Component Value Date    CRP < 0.5 03/16/2020       RADIOLOGY:         ASSESSMENT/PLAN    Assessment   Plan     Rheumatoid arthritis  - + RF, IgG, IgM > 100, IgA > 100, nodulosis, + synovitis  - prior tx: plaquenil (not effective), sulfasalazine (weakness and GI upset), arava (LFT elevation)   - xeljanz 11 mg daily   - methotrexate 25 mg PO weekly   - folic acid 2 mg daily   - mobic 7.5 mg daily PRN    Rheumatoid nodulosis- improved  - tx per #1    Paresthesia of finger  - resolved w/ tx of #1    Hand rash- continued  - ? Contact dermatitis vs eczema vs psoriasis vs other   - topical trimcinolone provided- no improvement of symptoms   - referal to dermatology- patient was never called with appointment. Will resend  referral    Raynauds- stable   - continue procardia 30 mg daily    Bilateral hand osteoarthritis   - continue mobic daily PRN    Nicotine dependence  - discussed risk of tobacco use including COPD, Cancer, CAD   - smoking cessation again discussed with patient    Medication monitoring   - CBC, CMP, sed rate, CRP q 4 weeks x2    Health maintenance   - pt declined pneumococcal vaccination      No follow-ups on file. Electronically signed by GUY Marlow CNP on 3/17/2020 at 10:43 AM    New Prescriptions    No medications on file       Thank you for allowing me to participate in the care of this patient. Please call if there are any questions.

## 2020-04-15 LAB
A/G RATIO: 1.7 (ref 1.5–2.5)
ABSOLUTE BASO #: 0 /CMM (ref 0–200)
ABSOLUTE EOS #: 100 /CMM (ref 0–500)
ABSOLUTE LYMPH #: 1100 /CMM (ref 1000–4800)
ABSOLUTE MONO #: 700 /CMM (ref 0–800)
ABSOLUTE NEUT #: 6500 /CMM (ref 1800–7700)
ALBUMIN SERPL-MCNC: 3.9 GM/DL (ref 3.5–5)
ALP BLD-CCNC: 80 IU/L (ref 41–137)
ALT SERPL-CCNC: 21 IU/L (ref 10–40)
ANION GAP SERPL CALCULATED.3IONS-SCNC: 3 MMOL/L (ref 4–12)
AST SERPL-CCNC: 16 IU/L (ref 15–41)
BASOPHILS RELATIVE PERCENT: 0.6 % (ref 0–2)
BILIRUB SERPL-MCNC: 0.9 MG/DL (ref 0.2–1)
BUN BLDV-MCNC: 11 MG/DL (ref 7–20)
C-REACTIVE PROTEIN: < 0.5 MG/DL
CALCIUM SERPL-MCNC: 9 MG/DL (ref 8.8–10.5)
CHLORIDE BLD-SCNC: 107 MEQ/L (ref 101–111)
CO2: 27 MEQ/L (ref 21–32)
CREAT SERPL-MCNC: 0.83 MG/DL (ref 0.6–1.3)
CREATININE CLEARANCE: >60
EOSINOPHILS RELATIVE PERCENT: 1.3 % (ref 0–6)
GLUCOSE: 104 MG/DL (ref 70–110)
HCT VFR BLD CALC: 42.3 % (ref 40–49)
HEMOGLOBIN: 14 GM/DL (ref 13.5–16.5)
LYMPHOCYTES RELATIVE PERCENT: 12.9 % (ref 15–45)
MACROCYTOSIS: ABNORMAL
MCH RBC QN AUTO: 37.2 PG (ref 27.5–33)
MCHC RBC AUTO-ENTMCNC: 33.1 GM/DL (ref 33–36)
MCV RBC AUTO: 112.2 CU MIC (ref 80–97)
MONOCYTES RELATIVE PERCENT: 8.5 % (ref 2–10)
MORPHOLOGY: NORMAL
NEUTROPHILS RELATIVE PERCENT: 76.7 % (ref 40–70)
NUCLEATED RBCS: 0.1 /100 WBC
PDW BLD-RTO: 14.4 % (ref 12–16)
PLATELET # BLD: 282 TH/CMM (ref 150–400)
POTASSIUM SERPL-SCNC: 4.6 MEQ/L (ref 3.6–5)
RBC # BLD: 3.77 MIL/CMM (ref 4.5–6)
SEDIMENTATION RATE, ERYTHROCYTE: 2 MM/HR
SODIUM BLD-SCNC: 137 MEQ/L (ref 135–145)
TOTAL PROTEIN: 6.2 G/DL (ref 6.2–8)
WBC # BLD: 8.5 TH/CMM (ref 4.4–10.5)

## 2020-04-16 RX ORDER — TOFACITINIB 11 MG/1
11 TABLET, FILM COATED, EXTENDED RELEASE ORAL DAILY
Qty: 30 TABLET | Refills: 3 | Status: SHIPPED | OUTPATIENT
Start: 2020-04-16 | End: 2020-06-19

## 2020-04-16 RX ORDER — FOLIC ACID 1 MG/1
TABLET ORAL
Qty: 120 TABLET | Refills: 0 | Status: SHIPPED | OUTPATIENT
Start: 2020-04-16 | End: 2020-05-26 | Stop reason: SDUPTHER

## 2020-05-21 ENCOUNTER — OFFICE VISIT (OUTPATIENT)
Dept: RHEUMATOLOGY | Age: 55
End: 2020-05-21
Payer: COMMERCIAL

## 2020-05-21 VITALS
SYSTOLIC BLOOD PRESSURE: 140 MMHG | HEART RATE: 61 BPM | OXYGEN SATURATION: 96 % | BODY MASS INDEX: 25.58 KG/M2 | HEIGHT: 67 IN | WEIGHT: 163 LBS | DIASTOLIC BLOOD PRESSURE: 80 MMHG

## 2020-05-21 PROCEDURE — 3017F COLORECTAL CA SCREEN DOC REV: CPT | Performed by: INTERNAL MEDICINE

## 2020-05-21 PROCEDURE — G8419 CALC BMI OUT NRM PARAM NOF/U: HCPCS | Performed by: INTERNAL MEDICINE

## 2020-05-21 PROCEDURE — 99214 OFFICE O/P EST MOD 30 MIN: CPT | Performed by: INTERNAL MEDICINE

## 2020-05-21 PROCEDURE — G8427 DOCREV CUR MEDS BY ELIG CLIN: HCPCS | Performed by: INTERNAL MEDICINE

## 2020-05-21 PROCEDURE — 4004F PT TOBACCO SCREEN RCVD TLK: CPT | Performed by: INTERNAL MEDICINE

## 2020-05-21 RX ORDER — TRIAMCINOLONE ACETONIDE 1 MG/G
CREAM TOPICAL
Qty: 80 G | Refills: 1 | Status: SHIPPED | OUTPATIENT
Start: 2020-05-21

## 2020-05-21 ASSESSMENT — ENCOUNTER SYMPTOMS
BACK PAIN: 1
EYE ITCHING: 0
ABDOMINAL PAIN: 0
TROUBLE SWALLOWING: 0
COUGH: 0
SHORTNESS OF BREATH: 0
EYE PAIN: 0
NAUSEA: 0
CONSTIPATION: 0
DIARRHEA: 0

## 2020-05-26 RX ORDER — FOLIC ACID 1 MG/1
TABLET ORAL
Qty: 120 TABLET | Refills: 0 | Status: SHIPPED | OUTPATIENT
Start: 2020-05-26 | End: 2020-09-14

## 2020-05-26 RX ORDER — MELOXICAM 7.5 MG/1
TABLET ORAL
Qty: 30 TABLET | Refills: 2 | Status: SHIPPED | OUTPATIENT
Start: 2020-05-26 | End: 2020-09-30 | Stop reason: SDUPTHER

## 2020-06-01 ENCOUNTER — TELEPHONE (OUTPATIENT)
Dept: RHEUMATOLOGY | Age: 55
End: 2020-06-01

## 2020-06-01 LAB
A/G RATIO: 1.7 (ref 1.5–2.5)
ABSOLUTE BASO #: 100 /CMM (ref 0–200)
ABSOLUTE EOS #: 100 /CMM (ref 0–500)
ABSOLUTE LYMPH #: 1200 /CMM (ref 1000–4800)
ABSOLUTE MONO #: 600 /CMM (ref 0–800)
ABSOLUTE NEUT #: 6700 /CMM (ref 1800–7700)
ALBUMIN SERPL-MCNC: 4.1 GM/DL (ref 3.5–5)
ALP BLD-CCNC: 84 IU/L (ref 41–137)
ALT SERPL-CCNC: 24 IU/L (ref 10–40)
ANION GAP SERPL CALCULATED.3IONS-SCNC: 3 MMOL/L (ref 4–12)
AST SERPL-CCNC: 19 IU/L (ref 15–41)
BASOPHILS RELATIVE PERCENT: 0.9 % (ref 0–2)
BILIRUB SERPL-MCNC: 1.1 MG/DL (ref 0.2–1)
BUN BLDV-MCNC: 12 MG/DL (ref 7–20)
BURR CELLS: NORMAL
C-REACTIVE PROTEIN: < 0.5 MG/DL
CALCIUM SERPL-MCNC: 9.1 MG/DL (ref 8.8–10.5)
CHLORIDE BLD-SCNC: 107 MEQ/L (ref 101–111)
CO2: 28 MEQ/L (ref 21–32)
CREAT SERPL-MCNC: 0.87 MG/DL (ref 0.6–1.3)
CREATININE CLEARANCE: >60
EOSINOPHILS RELATIVE PERCENT: 1.5 % (ref 0–6)
GLUCOSE: 99 MG/DL (ref 70–110)
HCT VFR BLD CALC: 43.6 % (ref 40–49)
HEMOGLOBIN: 14.4 GM/DL (ref 13.5–16.5)
LYMPHOCYTES RELATIVE PERCENT: 13.5 % (ref 15–45)
MACROCYTOSIS: ABNORMAL
MCH RBC QN AUTO: 37 PG (ref 27.5–33)
MCHC RBC AUTO-ENTMCNC: 32.9 GM/DL (ref 33–36)
MCV RBC AUTO: 112.5 CU MIC (ref 80–97)
MONOCYTES RELATIVE PERCENT: 6.8 % (ref 2–10)
NEUTROPHILS RELATIVE PERCENT: 77.3 % (ref 40–70)
NUCLEATED RBCS: 0.4 /100 WBC
PDW BLD-RTO: 13.7 % (ref 12–16)
PLATELET # BLD: 328 TH/CMM (ref 150–400)
POTASSIUM SERPL-SCNC: 5.2 MEQ/L (ref 3.6–5)
RBC # BLD: 3.88 MIL/CMM (ref 4.5–6)
SEDIMENTATION RATE, ERYTHROCYTE: 1 MM/HR
SODIUM BLD-SCNC: 138 MEQ/L (ref 135–145)
TOTAL PROTEIN: 6.5 G/DL (ref 6.2–8)
WBC # BLD: 8.6 TH/CMM (ref 4.4–10.5)

## 2020-06-15 RX ORDER — MELOXICAM 7.5 MG/1
TABLET ORAL
Qty: 30 TABLET | Refills: 2 | OUTPATIENT
Start: 2020-06-15

## 2020-06-19 RX ORDER — TOFACITINIB 11 MG/1
TABLET, FILM COATED, EXTENDED RELEASE ORAL
Qty: 30 TABLET | Refills: 5 | Status: SHIPPED | OUTPATIENT
Start: 2020-06-19 | End: 2020-07-16 | Stop reason: SDUPTHER

## 2020-07-16 RX ORDER — TOFACITINIB 11 MG/1
11 TABLET, FILM COATED, EXTENDED RELEASE ORAL DAILY
Qty: 30 TABLET | Refills: 11 | Status: SHIPPED | OUTPATIENT
Start: 2020-07-16 | End: 2021-08-02

## 2020-07-17 ENCOUNTER — TELEPHONE (OUTPATIENT)
Dept: PHYSICAL MEDICINE AND REHAB | Age: 55
End: 2020-07-17

## 2020-07-17 NOTE — TELEPHONE ENCOUNTER
----- Message from Leslie Armijo DO sent at 7/16/2020  5:12 PM EDT -----  Labs stable . Please cont. Methotrexate and xeljanz and have the labs repeated in 8 weeks. Please check on the status of the [facility] authorization. This is a continuation of the medication.

## 2020-07-27 ENCOUNTER — TELEPHONE (OUTPATIENT)
Dept: RHEUMATOLOGY | Age: 55
End: 2020-07-27

## 2020-09-01 ENCOUNTER — TELEPHONE (OUTPATIENT)
Dept: RHEUMATOLOGY | Age: 55
End: 2020-09-01

## 2020-09-14 LAB
A/G RATIO: 1.8 (ref 1.5–2.5)
ABSOLUTE BASO #: 0 /CMM (ref 0–200)
ABSOLUTE EOS #: 100 /CMM (ref 0–500)
ABSOLUTE LYMPH #: 1100 /CMM (ref 1000–4800)
ABSOLUTE MONO #: 600 /CMM (ref 0–800)
ABSOLUTE NEUT #: 5200 /CMM (ref 1800–7700)
ALBUMIN SERPL-MCNC: 4.2 GM/DL (ref 3.5–5)
ALP BLD-CCNC: 89 IU/L (ref 41–137)
ALT SERPL-CCNC: 16 IU/L (ref 10–40)
ANION GAP SERPL CALCULATED.3IONS-SCNC: 3 MMOL/L (ref 4–12)
AST SERPL-CCNC: 15 IU/L (ref 15–41)
BASOPHILS RELATIVE PERCENT: 0.5 % (ref 0–2)
BILIRUB SERPL-MCNC: 1.2 MG/DL (ref 0.2–1)
BUN BLDV-MCNC: 16 MG/DL (ref 7–20)
C-REACTIVE PROTEIN: < 0.5 MG/DL
CALCIUM SERPL-MCNC: 8.9 MG/DL (ref 8.8–10.5)
CHLORIDE BLD-SCNC: 107 MEQ/L (ref 101–111)
CO2: 27 MEQ/L (ref 21–32)
CREAT SERPL-MCNC: 0.83 MG/DL (ref 0.6–1.3)
CREATININE CLEARANCE: >60
EOSINOPHILS RELATIVE PERCENT: 2 % (ref 0–6)
GLUCOSE: 89 MG/DL (ref 70–110)
HCT VFR BLD CALC: 41.1 % (ref 40–49)
HEMOGLOBIN: 13.8 GM/DL (ref 13.5–16.5)
LYMPHOCYTES RELATIVE PERCENT: 15.8 % (ref 15–45)
MACROCYTOSIS: ABNORMAL
MCH RBC QN AUTO: 36.1 PG (ref 27.5–33)
MCHC RBC AUTO-ENTMCNC: 33.6 GM/DL (ref 33–36)
MCV RBC AUTO: 107.3 CU MIC (ref 80–97)
MONOCYTES RELATIVE PERCENT: 8.6 % (ref 2–10)
NEUTROPHILS RELATIVE PERCENT: 73.1 % (ref 40–70)
NUCLEATED RBCS: 0.1 /100 WBC
PDW BLD-RTO: 13.2 % (ref 12–16)
PLATELET # BLD: 277 TH/CMM (ref 150–400)
POTASSIUM SERPL-SCNC: 4.7 MEQ/L (ref 3.6–5)
RBC # BLD: 3.83 MIL/CMM (ref 4.5–6)
SEDIMENTATION RATE, ERYTHROCYTE: 2 MM/HR
SODIUM BLD-SCNC: 137 MEQ/L (ref 135–145)
TOTAL PROTEIN: 6.5 G/DL (ref 6.2–8)
WBC # BLD: 7.2 TH/CMM (ref 4.4–10.5)

## 2020-09-16 ENCOUNTER — TELEPHONE (OUTPATIENT)
Dept: RHEUMATOLOGY | Age: 55
End: 2020-09-16

## 2020-09-17 ENCOUNTER — TELEPHONE (OUTPATIENT)
Dept: RHEUMATOLOGY | Age: 55
End: 2020-09-17

## 2020-09-24 ENCOUNTER — OFFICE VISIT (OUTPATIENT)
Dept: RHEUMATOLOGY | Age: 55
End: 2020-09-24
Payer: COMMERCIAL

## 2020-09-24 VITALS
BODY MASS INDEX: 25.99 KG/M2 | HEART RATE: 64 BPM | SYSTOLIC BLOOD PRESSURE: 122 MMHG | DIASTOLIC BLOOD PRESSURE: 58 MMHG | HEIGHT: 67 IN | WEIGHT: 165.6 LBS | OXYGEN SATURATION: 97 % | TEMPERATURE: 97.1 F

## 2020-09-24 PROCEDURE — 3017F COLORECTAL CA SCREEN DOC REV: CPT | Performed by: INTERNAL MEDICINE

## 2020-09-24 PROCEDURE — 4004F PT TOBACCO SCREEN RCVD TLK: CPT | Performed by: INTERNAL MEDICINE

## 2020-09-24 PROCEDURE — G8427 DOCREV CUR MEDS BY ELIG CLIN: HCPCS | Performed by: INTERNAL MEDICINE

## 2020-09-24 PROCEDURE — 99214 OFFICE O/P EST MOD 30 MIN: CPT | Performed by: INTERNAL MEDICINE

## 2020-09-24 PROCEDURE — G8419 CALC BMI OUT NRM PARAM NOF/U: HCPCS | Performed by: INTERNAL MEDICINE

## 2020-09-24 ASSESSMENT — ENCOUNTER SYMPTOMS
BACK PAIN: 1
TROUBLE SWALLOWING: 0
EYE PAIN: 0
COUGH: 0
EYE ITCHING: 0
DIARRHEA: 0
CONSTIPATION: 0
SHORTNESS OF BREATH: 0
NAUSEA: 0
ABDOMINAL PAIN: 0

## 2020-09-30 RX ORDER — MELOXICAM 7.5 MG/1
TABLET ORAL
Qty: 30 TABLET | Refills: 2 | Status: SHIPPED | OUTPATIENT
Start: 2020-09-30 | End: 2020-12-24

## 2020-09-30 NOTE — TELEPHONE ENCOUNTER
Tiffani Nieves called requesting a refill on the following medications:  Requested Prescriptions     Pending Prescriptions Disp Refills    meloxicam (MOBIC) 7.5 MG tablet 30 tablet 2     Pharmacy verified: to [facility]  . pv      Date of last visit: 9/24/20  Date of next visit (if applicable): 3/96/0486

## 2020-10-06 ENCOUNTER — TELEPHONE (OUTPATIENT)
Dept: RHEUMATOLOGY | Age: 55
End: 2020-10-06

## 2020-10-06 NOTE — TELEPHONE ENCOUNTER
Kan Flanagan from Dr Zita Suarezure office calls asking for this patients most recent labs to be faxed to them.      Please fax to:  [phone]    DOLV  9/24/20  DONV  3/25/21

## 2020-11-12 LAB
A/G RATIO: 1.7 (ref 1.5–2.5)
ABSOLUTE BASO #: 100 /CMM (ref 0–200)
ABSOLUTE EOS #: 100 /CMM (ref 0–500)
ABSOLUTE LYMPH #: 1100 /CMM (ref 1000–4800)
ABSOLUTE MONO #: 600 /CMM (ref 0–800)
ABSOLUTE NEUT #: 5200 /CMM (ref 1800–7700)
ALBUMIN SERPL-MCNC: 4.3 GM/DL (ref 3.5–5)
ALP BLD-CCNC: 73 IU/L (ref 41–137)
ALT SERPL-CCNC: 14 IU/L (ref 10–40)
ANION GAP SERPL CALCULATED.3IONS-SCNC: 4 MMOL/L (ref 4–12)
AST SERPL-CCNC: 15 IU/L (ref 15–41)
BASOPHILS RELATIVE PERCENT: 1.2 % (ref 0–2)
BILIRUB SERPL-MCNC: 1.2 MG/DL (ref 0.2–1)
BUN BLDV-MCNC: 11 MG/DL (ref 7–20)
C-REACTIVE PROTEIN: < 0.5 MG/DL
CALCIUM SERPL-MCNC: 9 MG/DL (ref 8.8–10.5)
CHLORIDE BLD-SCNC: 107 MEQ/L (ref 101–111)
CO2: 29 MEQ/L (ref 21–32)
CREAT SERPL-MCNC: 0.8 MG/DL (ref 0.6–1.3)
CREATININE CLEARANCE: >60
EOSINOPHILS RELATIVE PERCENT: 1.2 % (ref 0–6)
GLUCOSE: 92 MG/DL (ref 70–110)
HCT VFR BLD CALC: 41.9 % (ref 40–49)
HEMOGLOBIN: 14.1 GM/DL (ref 13.5–16.5)
LYMPHOCYTES RELATIVE PERCENT: 15.5 % (ref 15–45)
MACROCYTOSIS: ABNORMAL
MCH RBC QN AUTO: 36.1 PG (ref 27.5–33)
MCHC RBC AUTO-ENTMCNC: 33.7 GM/DL (ref 33–36)
MCV RBC AUTO: 107.1 CU MIC (ref 80–97)
MONOCYTES RELATIVE PERCENT: 8.1 % (ref 2–10)
NEUTROPHILS RELATIVE PERCENT: 74 % (ref 40–70)
NUCLEATED RBCS: 0 /100 WBC
PDW BLD-RTO: 13.4 % (ref 12–16)
PLATELET # BLD: 254 TH/CMM (ref 150–400)
POTASSIUM SERPL-SCNC: 4.6 MEQ/L (ref 3.6–5)
RBC # BLD: 3.91 MIL/CMM (ref 4.5–6)
SEDIMENTATION RATE, ERYTHROCYTE: 3 MM/HR
SODIUM BLD-SCNC: 140 MEQ/L (ref 135–145)
TOTAL PROTEIN: 6.8 G/DL (ref 6.2–8)
WBC # BLD: 7.1 TH/CMM (ref 4.4–10.5)

## 2020-11-13 ENCOUNTER — TELEPHONE (OUTPATIENT)
Dept: RHEUMATOLOGY | Age: 55
End: 2020-11-13

## 2020-12-24 RX ORDER — MELOXICAM 7.5 MG/1
TABLET ORAL
Qty: 90 TABLET | Refills: 1 | Status: SHIPPED | OUTPATIENT
Start: 2020-12-24 | Stop reason: SDUPTHER

## 2021-02-22 LAB
A/G RATIO: 1.7 (ref 1.5–2.5)
ABSOLUTE BASO #: 0 /CMM (ref 0–200)
ABSOLUTE EOS #: 100 /CMM (ref 0–500)
ABSOLUTE LYMPH #: 1000 /CMM (ref 1000–4800)
ABSOLUTE MONO #: 600 /CMM (ref 0–800)
ABSOLUTE NEUT #: 4300 /CMM (ref 1800–7700)
ALBUMIN SERPL-MCNC: 3.8 GM/DL (ref 3.5–5)
ALP BLD-CCNC: 61 IU/L (ref 41–137)
ALT SERPL-CCNC: 11 IU/L (ref 10–40)
ANION GAP SERPL CALCULATED.3IONS-SCNC: 6 MMOL/L (ref 4–12)
AST SERPL-CCNC: 12 IU/L (ref 15–41)
BASOPHILS RELATIVE PERCENT: 0.4 % (ref 0–2)
BILIRUB SERPL-MCNC: 1.2 MG/DL (ref 0.2–1)
BUN BLDV-MCNC: 10 MG/DL (ref 7–20)
C-REACTIVE PROTEIN: < 0.5 MG/DL
CALCIUM SERPL-MCNC: 8.6 MG/DL (ref 8.8–10.5)
CHLORIDE BLD-SCNC: 111 MEQ/L (ref 101–111)
CO2: 26 MEQ/L (ref 21–32)
CREAT SERPL-MCNC: 0.77 MG/DL (ref 0.6–1.3)
CREATININE CLEARANCE: >60
EOSINOPHILS RELATIVE PERCENT: 1.7 % (ref 0–6)
GLUCOSE: 94 MG/DL (ref 70–110)
HCT VFR BLD CALC: 40.7 % (ref 40–49)
HEMOGLOBIN: 13.3 GM/DL (ref 13.5–16.5)
LYMPHOCYTES RELATIVE PERCENT: 16.3 % (ref 15–45)
MACROCYTOSIS: ABNORMAL
MCH RBC QN AUTO: 35.5 PG (ref 27.5–33)
MCHC RBC AUTO-ENTMCNC: 32.8 GM/DL (ref 33–36)
MCV RBC AUTO: 108.3 CU MIC (ref 80–97)
MONOCYTES RELATIVE PERCENT: 10 % (ref 2–10)
NEUTROPHILS RELATIVE PERCENT: 71.6 % (ref 40–70)
NUCLEATED RBCS: 0 /100 WBC
PDW BLD-RTO: 13.3 % (ref 12–16)
PLATELET # BLD: 280 TH/CMM (ref 150–400)
POTASSIUM SERPL-SCNC: 4.4 MEQ/L (ref 3.6–5)
RBC # BLD: 3.76 MIL/CMM (ref 4.5–6)
SEDIMENTATION RATE, ERYTHROCYTE: 1 MM/HR
SODIUM BLD-SCNC: 143 MEQ/L (ref 135–145)
TOTAL PROTEIN: 6 G/DL (ref 6.2–8)
WBC # BLD: 6 TH/CMM (ref 4.4–10.5)

## 2021-02-23 ENCOUNTER — TELEPHONE (OUTPATIENT)
Dept: RHEUMATOLOGY | Age: 56
End: 2021-02-23

## 2021-02-23 DIAGNOSIS — M05.79 RHEUMATOID ARTHRITIS INVOLVING MULTIPLE SITES WITH POSITIVE RHEUMATOID FACTOR (HCC): ICD-10-CM

## 2021-02-23 RX ORDER — FOLIC ACID 1 MG/1
2 TABLET ORAL DAILY
Qty: 180 TABLET | Refills: 1 | Status: SHIPPED | OUTPATIENT
Start: 2021-02-23 | End: 2021-08-18

## 2021-02-23 NOTE — TELEPHONE ENCOUNTER
----- Message from Christine Pink,  sent at 2/23/2021  7:27 AM EST -----  Labs with a mild low calcium level but otherwise stable.   Please have your labs repeated in 12 weeks

## 2021-03-25 ENCOUNTER — OFFICE VISIT (OUTPATIENT)
Dept: RHEUMATOLOGY | Age: 56
End: 2021-03-25
Payer: MEDICARE

## 2021-03-25 VITALS
HEIGHT: 67 IN | BODY MASS INDEX: 25.08 KG/M2 | HEART RATE: 57 BPM | SYSTOLIC BLOOD PRESSURE: 138 MMHG | OXYGEN SATURATION: 98 % | DIASTOLIC BLOOD PRESSURE: 82 MMHG | WEIGHT: 159.8 LBS

## 2021-03-25 DIAGNOSIS — M19.042 OSTEOARTHRITIS OF BOTH HANDS, UNSPECIFIED OSTEOARTHRITIS TYPE: ICD-10-CM

## 2021-03-25 DIAGNOSIS — M05.79 RHEUMATOID ARTHRITIS INVOLVING MULTIPLE SITES WITH POSITIVE RHEUMATOID FACTOR (HCC): Primary | ICD-10-CM

## 2021-03-25 DIAGNOSIS — F17.219 CIGARETTE NICOTINE DEPENDENCE WITH NICOTINE-INDUCED DISORDER: ICD-10-CM

## 2021-03-25 DIAGNOSIS — I73.00 RAYNAUD'S DISEASE WITHOUT GANGRENE: ICD-10-CM

## 2021-03-25 DIAGNOSIS — Z87.891 PERSONAL HISTORY OF TOBACCO USE: ICD-10-CM

## 2021-03-25 DIAGNOSIS — M06.30 RHEUMATOID NODULOSIS (HCC): ICD-10-CM

## 2021-03-25 DIAGNOSIS — M19.041 OSTEOARTHRITIS OF BOTH HANDS, UNSPECIFIED OSTEOARTHRITIS TYPE: ICD-10-CM

## 2021-03-25 DIAGNOSIS — Z51.81 MEDICATION MONITORING ENCOUNTER: ICD-10-CM

## 2021-03-25 DIAGNOSIS — F17.210 CIGARETTE NICOTINE DEPENDENCE, UNCOMPLICATED: ICD-10-CM

## 2021-03-25 PROCEDURE — 4004F PT TOBACCO SCREEN RCVD TLK: CPT | Performed by: INTERNAL MEDICINE

## 2021-03-25 PROCEDURE — 3017F COLORECTAL CA SCREEN DOC REV: CPT | Performed by: INTERNAL MEDICINE

## 2021-03-25 PROCEDURE — G8419 CALC BMI OUT NRM PARAM NOF/U: HCPCS | Performed by: INTERNAL MEDICINE

## 2021-03-25 PROCEDURE — 99214 OFFICE O/P EST MOD 30 MIN: CPT | Performed by: INTERNAL MEDICINE

## 2021-03-25 PROCEDURE — G0296 VISIT TO DETERM LDCT ELIG: HCPCS | Performed by: INTERNAL MEDICINE

## 2021-03-25 PROCEDURE — G8427 DOCREV CUR MEDS BY ELIG CLIN: HCPCS | Performed by: INTERNAL MEDICINE

## 2021-03-25 PROCEDURE — G8484 FLU IMMUNIZE NO ADMIN: HCPCS | Performed by: INTERNAL MEDICINE

## 2021-03-25 RX ORDER — VARENICLINE TARTRATE 0.5 MG/1
.5-1 TABLET, FILM COATED ORAL SEE ADMIN INSTRUCTIONS
Qty: 57 TABLET | Refills: 0 | Status: SHIPPED | OUTPATIENT
Start: 2021-03-25

## 2021-03-25 ASSESSMENT — ENCOUNTER SYMPTOMS
NAUSEA: 0
EYE PAIN: 0
VOMITING: 0
CONSTIPATION: 0
EYE REDNESS: 0
DIARRHEA: 0
EYES NEGATIVE: 1
SHORTNESS OF BREATH: 0
COUGH: 0
WHEEZING: 0

## 2021-03-25 NOTE — PROGRESS NOTES
[facility] RHEUMATOLOGY FOLLOW UP NOTE       Date Of Service: 3/25/2021  Provider: Cintia Betancourt DO  PCP: No primary care provider on file. Name: Etta Ny   MRN: [id number]        History of Present Illness (HPI)     Chief Complaint   Patient presents with    6 Month Follow-Up        Etta Ny  is a(n)56 y.o. male with a hx of f rheumatoid arthritis w/ nodulris here for the f/u evaluation of rheumatoid arthritis     Rheumatoid arthritis - stable, denies flares, joint swelling or morning stiffness. - reported complaince with Methotrexate and xeljanz. Mild intermittent shoulder pains with weather changes. Rheu. nodulsos- no worsening, mild nodules right elbows. Low back pain -- intemrttent, occasional after standing, walking, relief with sitting. Denies weakness, incontinence, radicular pain. Raynauds active with cold weather. Tobacco dependence 1 ppd or less     REVIEW OF SYSTEMS: (ROS)    Review of Systems   Constitutional: Negative for diaphoresis, fatigue and fever. HENT: Negative for congestion, hearing loss and nosebleeds. Eyes: Negative. Negative for pain and redness. Respiratory: Negative for cough, shortness of breath and wheezing. Cardiovascular: Negative. Negative for chest pain. Gastrointestinal: Negative for constipation, diarrhea, nausea and vomiting. Genitourinary: Negative for difficulty urinating, frequency and hematuria. Musculoskeletal: Negative for myalgias. Skin: Negative for rash. Neurological: Negative for dizziness, weakness and headaches. Hematological: Does not bruise/bleed easily. Psychiatric/Behavioral: Negative for sleep disturbance. The patient is not nervous/anxious. PAST MEDICAL HISTORY     has a past medical history of Rheumatoid arthritis ([facility] [address] SOCIAL HISTORY     reports that he has been smoking cigarettes. He has a 17.50 pack-year smoking history.  He has never used smokeless tobacco. He reports that he does not drink alcohol or use drugs. ALLERGIES   No Known Allergies    CURRENT MEDICATIONS      Current Outpatient Medications:     methotrexate (RHEUMATREX) 2.5 MG chemo tablet, Take 4 tablets by mouth once a week, Disp: 48 tablet, Rfl: 0    folic acid (FOLVITE) 1 MG tablet, Take 2 tablets by mouth daily, Disp: 180 tablet, Rfl: 1    meloxicam (MOBIC) 7.5 MG tablet, take 1 tablet once daily, Disp: 90 tablet, Rfl: 1    Tofacitinib Citrate ER (XELJANZ XR) 11 MG TB24, Take 11 mg by mouth daily, Disp: 30 tablet, Rfl: 11    triamcinolone (KENALOG) 0.1 % cream, Apply topically 2 times daily. , Disp: 80 g, Rfl: 1    losartan (COZAAR) 100 MG tablet, , Disp: , Rfl:     NIFEdipine (ADALAT CC) 30 MG extended release tablet, take 1 tablet by mouth once daily, Disp: 30 tablet, Rfl: 5    nicotine (NICODERM CQ) 21 MG/24HR, Place 1 patch onto the skin every 24 hours, Disp: 30 patch, Rfl: 3    PHYSICAL EXAMINATION / OBJECTIVE     Objective:  /82 (Site: Left Upper Arm, Position: Sitting, Cuff Size: Medium Adult)   Pulse 57   Ht 5' 7.01\" (1.702 m)   Wt 159 lb 12.8 oz (72.5 kg)   SpO2 98%   BMI 25.02 kg/m²     Physical Exam  Vitals signs reviewed. Constitutional:       General: He is not in acute distress. Appearance: He is well-developed. He is not diaphoretic. HENT:      Head: Normocephalic and atraumatic. Right Ear: External ear normal.      Left Ear: External ear normal.   Eyes:      Conjunctiva/sclera: Conjunctivae normal.      Pupils: Pupils are equal, round, and reactive to light. Neck:      Musculoskeletal: Neck supple. Cardiovascular:      Rate and Rhythm: Normal rate and regular rhythm. Heart sounds: Normal heart sounds. Pulmonary:      Effort: Pulmonary effort is normal.      Breath sounds: Normal breath sounds. Musculoskeletal: Normal range of motion. General: Swelling present. Lymphadenopathy:      Cervical: No cervical adenopathy. Skin:     General: Skin is warm and dry.       Comments: Scabbed lesions right forearm   Neurological:      Mental Status: He is alert and oriented to person, place, and time. Psychiatric:         Mood and Affect: Affect normal.         Upper extremities:   ROM --- intact bilateral upper extremities. Msk Strength 5/5 in bilateral upper extremities     NOT tender  Boggy MCP left  2,3       Lower extremities:  ROM - intact bilateral lower extremities ,   Strength intact bilateral- intact bilateral lower extremities. NOT tender  NO swelling                 Exam KEY:   Tender :  T    Swelling: S,   Deformities: D,   Non-tender : NT  ,  No swelling: NS       MDHAQ:   3/25/2021 --- MDHAQ 0 + 0 + 0 = 0       Remission: <3  Low Disease Activity: <6  Moderate Disease Activity: >=6 and <=12  High Disease Activity: >12     LABS      Lab Results   Component Value Date    WBC 6.0 02/22/2021    HGB 13.3 (L) 02/22/2021    .3 (H) 02/22/2021    MCHC 32.8 (L) 02/22/2021    RDW 13.3 02/22/2021     02/22/2021    NEUTROABS 4300 02/22/2021    LYMPHSABS 1000 02/22/2021    EOSABS 100 02/22/2021    BASOSABS 0 02/22/2021         Chemistry        Component Value Date/Time     02/22/2021 0906    K 4.4 02/22/2021 0906     02/22/2021 0906    CO2 26 02/22/2021 0906    BUN 10 02/22/2021 0906    CREATININE 0.77 02/22/2021 0906        Component Value Date/Time    CALCIUM 8.60 (L) 02/22/2021 0906    ALKPHOS 61 02/22/2021 0906    AST 12 (L) 02/22/2021 0906    ALT 11 02/22/2021 0906    BILITOT 1.2 (H) 02/22/2021 0906            Lab Results   Component Value Date    SEDRATE 1 02/22/2021    SEDRATE 3 11/12/2020    SEDRATE 2 09/14/2020    CRP < 0.5 02/22/2021    CRP < 0.5 11/12/2020    CRP < 0.5 09/14/2020       No results found for: VITD25    No results found for: ANASCRN  No results found for: SSA  No results found for: SSB  No results found for: ANTI-SMITH  No results found for: DSDNAAB   No results found for: ANTIRNP  No results found for: C3, C4  No results found for: CCPAB  Lab Results   Component Value Date    RF >10 11/02/2017           RADIOLOGY / PROCEDURES:     ASSESSMENT/PLAN:     1. Rheumatoid arthritis involving multiple sites with positive rheumatoid factor ([facility] [address]    2. Rheumatoid nodulosis (HCC)    3. Raynaud's disease without gangrene    4. Osteoarthritis of both hands, unspecified osteoarthritis type    5. Medication monitoring encounter    Rheumatoid arthritis  Rheumatoid nodulosis- resolved. - + RF, IgG, IgM > 100, IgA > 100, nodulosis, + synovitis  - prior tx: plaquenil (not effective), sulfasalazine (weakness and GI upset), arava (LFT elevation)   - xeljanz 11 mg daily   - methotrexate to 10 mg PO weekly -- if labs stable on the next evaluation will try to decrease to 5mg q wk    - folic acid 2 mg daily   - mobic 7.5 mg daily PRN      Hand rash- resolved  With topical antifungal from dermatology. Raynauds- stable - - continue procardia 30 mg daily    Bilateral hand osteoarthritis -  continue mobic daily PRN    Nicotine dependence   - discussed risk of tobacco use including COPD, Cancer, CAD   - smoking cessation previously discussed on multiple occasions    - chantix provided. 3-   - lung cancer screening CT ordered     Medication monitoring -  CBC, CMP, sed rate, CRP q 12 weeks. Health maintenance -  - pt declined pneumococcal vaccination    New Prescriptions    No medications on file       3/25/2021    Electronically signed by Yamilex Mattson DO on 3/25/21 at 10:41 AM EDT  Please contact the office if you have any questions or change of symptoms.              Low Dose CT (LDCT) Lung Screening criteria met   Age 50-69   Pack year smoking >30   Still smoking or less than 15 year since quit   No sign or symptoms of lung cancer   > 11 months since last LDCT     Risks and benefits of lung cancer screening with LDCT scans discussed:    Significance of positive screen - False-positive LDCT results often occur. 95% of all positive results do not lead to

## 2021-04-23 ENCOUNTER — TELEPHONE (OUTPATIENT)
Dept: RHEUMATOLOGY | Age: 56
End: 2021-04-23

## 2021-04-23 NOTE — TELEPHONE ENCOUNTER
Pt would like to get the CT lung screen done at [facility] in [place].  Please fax the order (he didn't have a fax number)

## 2021-05-05 DIAGNOSIS — M05.79 RHEUMATOID ARTHRITIS INVOLVING MULTIPLE SITES WITH POSITIVE RHEUMATOID FACTOR (HCC): ICD-10-CM

## 2021-05-05 LAB
A/G RATIO: 1.6 (ref 1.5–2.5)
ABSOLUTE BASO #: 0 /CMM (ref 0–200)
ABSOLUTE EOS #: 100 /CMM (ref 0–500)
ABSOLUTE LYMPH #: 1200 /CMM (ref 1000–4800)
ABSOLUTE MONO #: 800 /CMM (ref 0–800)
ABSOLUTE NEUT #: 7000 /CMM (ref 1800–7700)
ALBUMIN SERPL-MCNC: 4.2 GM/DL (ref 3.5–5)
ALP BLD-CCNC: 88 IU/L (ref 41–137)
ALT SERPL-CCNC: 13 IU/L (ref 10–40)
ANION GAP SERPL CALCULATED.3IONS-SCNC: 6 MMOL/L (ref 4–12)
AST SERPL-CCNC: 15 IU/L (ref 15–41)
BASOPHILS RELATIVE PERCENT: 0.3 % (ref 0–2)
BILIRUB SERPL-MCNC: 0.9 MG/DL (ref 0.2–1)
BUN BLDV-MCNC: 17 MG/DL (ref 7–20)
C-REACTIVE PROTEIN: 0.7 MG/DL
CALCIUM SERPL-MCNC: 9.2 MG/DL (ref 8.8–10.5)
CHLORIDE BLD-SCNC: 107 MEQ/L (ref 101–111)
CO2: 26 MEQ/L (ref 21–32)
CREAT SERPL-MCNC: 0.82 MG/DL (ref 0.6–1.3)
CREATININE CLEARANCE: >60
EOSINOPHILS RELATIVE PERCENT: 1.3 % (ref 0–6)
GLUCOSE: 98 MG/DL (ref 70–110)
HCT VFR BLD CALC: 41.6 % (ref 40–49)
HEMOGLOBIN: 14.2 GM/DL (ref 13.5–16.5)
LYMPHOCYTES RELATIVE PERCENT: 13.3 % (ref 15–45)
MACROCYTOSIS: ABNORMAL
MCH RBC QN AUTO: 36.4 PG (ref 27.5–33)
MCHC RBC AUTO-ENTMCNC: 34.1 GM/DL (ref 33–36)
MCV RBC AUTO: 107 CU MIC (ref 80–97)
MONOCYTES RELATIVE PERCENT: 8.9 % (ref 2–10)
NEUTROPHILS RELATIVE PERCENT: 76.2 % (ref 40–70)
NUCLEATED RBCS: 0.1 /100 WBC
PDW BLD-RTO: 13.4 % (ref 12–16)
PLATELET # BLD: 277 TH/CMM (ref 150–400)
POTASSIUM SERPL-SCNC: 4.3 MEQ/L (ref 3.6–5)
RBC # BLD: 3.88 MIL/CMM (ref 4.5–6)
SEDIMENTATION RATE, ERYTHROCYTE: 1 MM/HR
SODIUM BLD-SCNC: 139 MEQ/L (ref 135–145)
TOTAL PROTEIN: 6.8 G/DL (ref 6.2–8)
WBC # BLD: 9.1 TH/CMM (ref 4.4–10.5)

## 2021-05-05 NOTE — RESULT ENCOUNTER NOTE
The labs are stable compared with the prior evaluation. Please continue current medications and have your labs repeated in 12 weeks.

## 2021-05-06 ENCOUNTER — TELEPHONE (OUTPATIENT)
Dept: RHEUMATOLOGY | Age: 56
End: 2021-05-06

## 2021-06-21 ENCOUNTER — TELEPHONE (OUTPATIENT)
Dept: RHEUMATOLOGY | Age: 56
End: 2021-06-21

## 2021-06-21 DIAGNOSIS — Z51.81 MEDICATION MONITORING ENCOUNTER: Primary | ICD-10-CM

## 2021-06-21 NOTE — TELEPHONE ENCOUNTER
Patient is calling in stating he is due for blood work and needs new orders faxed to [facility]. He also said he was told a few months ago by the office that he needed to have a lung xray done at [facility], but no one ever called him to schedule. Please call him to advise/confirm.

## 2021-06-21 NOTE — TELEPHONE ENCOUNTER
Attempted to call him and inform orders faxed to both [facility] for the CT scan and [facility] for the labs. LM informing both were done. See copy scanned.

## 2021-06-23 DIAGNOSIS — M05.79 RHEUMATOID ARTHRITIS INVOLVING MULTIPLE SITES WITH POSITIVE RHEUMATOID FACTOR (HCC): ICD-10-CM

## 2021-06-23 RX ORDER — MELOXICAM 7.5 MG/1
TABLET ORAL
Qty: 90 TABLET | Refills: 1 | Status: SHIPPED | OUTPATIENT
Start: 2021-06-23

## 2021-07-08 LAB
A/G RATIO: 1.6 (ref 1.5–2.5)
ABSOLUTE BASO #: 0 /CMM (ref 0–200)
ABSOLUTE EOS #: 100 /CMM (ref 0–500)
ABSOLUTE LYMPH #: 800 /CMM (ref 1000–4800)
ABSOLUTE MONO #: 400 /CMM (ref 0–800)
ABSOLUTE NEUT #: 4500 /CMM (ref 1800–7700)
ALBUMIN SERPL-MCNC: 3.9 GM/DL (ref 3.5–5)
ALP BLD-CCNC: 77 IU/L (ref 41–137)
ALT SERPL-CCNC: 11 IU/L (ref 10–40)
ANION GAP SERPL CALCULATED.3IONS-SCNC: 6 MMOL/L (ref 4–12)
AST SERPL-CCNC: 15 IU/L (ref 15–41)
BASOPHILS RELATIVE PERCENT: 0.3 % (ref 0–2)
BILIRUB SERPL-MCNC: 1.3 MG/DL (ref 0.2–1)
BUN BLDV-MCNC: 13 MG/DL (ref 7–20)
C-REACTIVE PROTEIN: < 0.5 MG/DL
CALCIUM SERPL-MCNC: 8.4 MG/DL (ref 8.8–10.5)
CHLORIDE BLD-SCNC: 110 MEQ/L (ref 101–111)
CO2: 24 MEQ/L (ref 21–32)
CREAT SERPL-MCNC: 0.76 MG/DL (ref 0.6–1.3)
CREATININE CLEARANCE: >60
EOSINOPHILS RELATIVE PERCENT: 1.4 % (ref 0–6)
GLUCOSE: 86 MG/DL (ref 70–110)
HCT VFR BLD CALC: 39.3 % (ref 40–49)
HEMOGLOBIN: 13 GM/DL (ref 13.5–16.5)
LYMPHOCYTES RELATIVE PERCENT: 14 % (ref 15–45)
MACROCYTOSIS: ABNORMAL
MCH RBC QN AUTO: 36.3 PG (ref 27.5–33)
MCHC RBC AUTO-ENTMCNC: 33 GM/DL (ref 33–36)
MCV RBC AUTO: 109.8 CU MIC (ref 80–97)
MONOCYTES RELATIVE PERCENT: 7.5 % (ref 2–10)
NEUTROPHILS RELATIVE PERCENT: 76.8 % (ref 40–70)
NUCLEATED RBCS: 0.1 /100 WBC
PDW BLD-RTO: 13.7 % (ref 12–16)
PLATELET # BLD: 253 TH/CMM (ref 150–400)
POTASSIUM SERPL-SCNC: 4.2 MEQ/L (ref 3.6–5)
RBC # BLD: 3.57 MIL/CMM (ref 4.5–6)
SEDIMENTATION RATE, ERYTHROCYTE: 4 MM/HR
SODIUM BLD-SCNC: 140 MEQ/L (ref 135–145)
TOTAL PROTEIN: 6.3 G/DL (ref 6.2–8)
WBC # BLD: 5.8 TH/CMM (ref 4.4–10.5)

## 2021-07-09 ENCOUNTER — TELEPHONE (OUTPATIENT)
Dept: RHEUMATOLOGY | Age: 56
End: 2021-07-09

## 2021-07-09 DIAGNOSIS — M05.79 RHEUMATOID ARTHRITIS INVOLVING MULTIPLE SITES WITH POSITIVE RHEUMATOID FACTOR (HCC): ICD-10-CM

## 2021-07-09 NOTE — TELEPHONE ENCOUNTER
----- Message from GUY Guerrero CNP sent at 7/9/2021  9:44 AM EDT -----  Mild anemia and lymphopenia (low white blood cell count). Please decrease methotrexate to 5 mg weekly (2 tablets) as discussed at your last appointment with Dr. Mag Linder and repeat labs in 12 weeks.

## 2021-07-31 DIAGNOSIS — M05.79 RHEUMATOID ARTHRITIS INVOLVING MULTIPLE SITES WITH POSITIVE RHEUMATOID FACTOR (HCC): ICD-10-CM

## 2021-07-31 DIAGNOSIS — Z51.81 MEDICATION MONITORING ENCOUNTER: ICD-10-CM

## 2021-08-02 RX ORDER — TOFACITINIB 11 MG/1
TABLET, FILM COATED, EXTENDED RELEASE ORAL
Qty: 30 TABLET | Refills: 11 | Status: SHIPPED | OUTPATIENT
Start: 2021-08-02 | End: 2022-10-03

## 2021-08-02 NOTE — TELEPHONE ENCOUNTER
DOLV: 03/25/21  DONV: 09/30/21  LAST LAB DRAW: 07/08/21  LAST TB TEST: 10/07/19    Lab Results   Component Value Date     07/08/2021    K 4.2 07/08/2021     07/08/2021    CO2 24 07/08/2021    BUN 13 07/08/2021    CREATININE 0.76 07/08/2021    GLUCOSE 86 07/08/2021    CALCIUM 8.40 (L) 07/08/2021    PROT 6.3 07/08/2021    LABALBU 3.9 07/08/2021    BILITOT 1.3 (H) 07/08/2021    ALKPHOS 77 07/08/2021    AST 15 07/08/2021    ALT 11 07/08/2021    LABGLOM >90 10/07/2019    AGRATIO 1.6 07/08/2021       Recent Labs     07/08/21  0909   WBC 5.8   HGB 13.0*   HCT 39.3*   .8*          Lab Results   Component Value Date    SEDRATE 4 07/08/2021       Lab Results   Component Value Date    CRP < 0.5 07/08/2021

## 2021-08-18 DIAGNOSIS — M05.79 RHEUMATOID ARTHRITIS INVOLVING MULTIPLE SITES WITH POSITIVE RHEUMATOID FACTOR (HCC): ICD-10-CM

## 2021-08-18 RX ORDER — FOLIC ACID 1 MG/1
2 TABLET ORAL DAILY
Qty: 180 TABLET | Refills: 1 | Status: SHIPPED | OUTPATIENT
Start: 2021-08-18 | End: 2022-02-15

## 2021-09-25 DIAGNOSIS — M05.79 RHEUMATOID ARTHRITIS INVOLVING MULTIPLE SITES WITH POSITIVE RHEUMATOID FACTOR (HCC): ICD-10-CM

## 2021-09-28 DIAGNOSIS — M05.79 RHEUMATOID ARTHRITIS INVOLVING MULTIPLE SITES WITH POSITIVE RHEUMATOID FACTOR (HCC): ICD-10-CM

## 2021-09-28 LAB
A/G RATIO: 1.8 (ref 1.5–2.5)
ABSOLUTE BASO #: 0 /CMM (ref 0–200)
ABSOLUTE EOS #: 100 /CMM (ref 0–500)
ABSOLUTE LYMPH #: 900 /CMM (ref 1000–4800)
ABSOLUTE MONO #: 500 /CMM (ref 0–800)
ABSOLUTE NEUT #: 4800 /CMM (ref 1800–7700)
ALBUMIN SERPL-MCNC: 4 GM/DL (ref 3.5–5)
ALP BLD-CCNC: 68 IU/L (ref 41–137)
ALT SERPL-CCNC: 9 IU/L (ref 10–40)
ANION GAP SERPL CALCULATED.3IONS-SCNC: 6 MMOL/L (ref 4–12)
AST SERPL-CCNC: 13 IU/L (ref 15–41)
BASOPHILS RELATIVE PERCENT: 0.6 % (ref 0–2)
BILIRUB SERPL-MCNC: 1.3 MG/DL (ref 0.2–1)
BUN BLDV-MCNC: 13 MG/DL (ref 7–20)
C-REACTIVE PROTEIN: < 0.5 MG/DL
CALCIUM SERPL-MCNC: 8.7 MG/DL (ref 8.8–10.5)
CHLORIDE BLD-SCNC: 110 MEQ/L (ref 101–111)
CO2: 25 MEQ/L (ref 21–32)
CREAT SERPL-MCNC: 0.65 MG/DL (ref 0.6–1.3)
CREATININE CLEARANCE: >60
EOSINOPHILS RELATIVE PERCENT: 1.3 % (ref 0–6)
GLUCOSE: 91 MG/DL (ref 70–110)
HCT VFR BLD CALC: 41.8 % (ref 40–49)
HEMOGLOBIN: 13.8 GM/DL (ref 13.5–16.5)
LYMPHOCYTES RELATIVE PERCENT: 14.6 % (ref 15–45)
MACROCYTOSIS: ABNORMAL
MCH RBC QN AUTO: 36.1 PG (ref 27.5–33)
MCHC RBC AUTO-ENTMCNC: 33.1 GM/DL (ref 33–36)
MCV RBC AUTO: 109.1 CU MIC (ref 80–97)
MONOCYTES RELATIVE PERCENT: 7.8 % (ref 2–10)
NEUTROPHILS RELATIVE PERCENT: 75.7 % (ref 40–70)
NUCLEATED RBCS: 0.2 /100 WBC
PDW BLD-RTO: 13.7 % (ref 12–16)
PLATELET # BLD: 251 TH/CMM (ref 150–400)
POTASSIUM SERPL-SCNC: 4.2 MEQ/L (ref 3.6–5)
RBC # BLD: 3.83 MIL/CMM (ref 4.5–6)
SEDIMENTATION RATE, ERYTHROCYTE: 4 MM/HR
SODIUM BLD-SCNC: 141 MEQ/L (ref 135–145)
TOTAL PROTEIN: 6.2 G/DL (ref 6.2–8)
WBC # BLD: 6.3 TH/CMM (ref 4.4–10.5)

## 2021-09-29 ENCOUNTER — TELEPHONE (OUTPATIENT)
Dept: RHEUMATOLOGY | Age: 56
End: 2021-09-29

## 2021-09-29 NOTE — TELEPHONE ENCOUNTER
----- Message from Maddison March, DO sent at 9/28/2021  6:10 PM EDT -----  The labs are stable with a continued low white blood count (lymphocyte count). Stable compared to your prior evaluation. Please  have your labs repeated in 12 weeks.

## 2021-10-06 ENCOUNTER — OFFICE VISIT (OUTPATIENT)
Dept: RHEUMATOLOGY | Age: 56
End: 2021-10-06
Payer: MEDICARE

## 2021-10-06 VITALS
WEIGHT: 157 LBS | OXYGEN SATURATION: 99 % | BODY MASS INDEX: 24.64 KG/M2 | HEIGHT: 67 IN | SYSTOLIC BLOOD PRESSURE: 136 MMHG | HEART RATE: 66 BPM | DIASTOLIC BLOOD PRESSURE: 84 MMHG

## 2021-10-06 DIAGNOSIS — M05.79 RHEUMATOID ARTHRITIS INVOLVING MULTIPLE SITES WITH POSITIVE RHEUMATOID FACTOR (HCC): Primary | ICD-10-CM

## 2021-10-06 DIAGNOSIS — M06.30 RHEUMATOID NODULOSIS (HCC): ICD-10-CM

## 2021-10-06 DIAGNOSIS — Z51.81 MEDICATION MONITORING ENCOUNTER: ICD-10-CM

## 2021-10-06 DIAGNOSIS — M19.042 OSTEOARTHRITIS OF BOTH HANDS, UNSPECIFIED OSTEOARTHRITIS TYPE: ICD-10-CM

## 2021-10-06 DIAGNOSIS — F17.219 CIGARETTE NICOTINE DEPENDENCE WITH NICOTINE-INDUCED DISORDER: ICD-10-CM

## 2021-10-06 DIAGNOSIS — M19.041 OSTEOARTHRITIS OF BOTH HANDS, UNSPECIFIED OSTEOARTHRITIS TYPE: ICD-10-CM

## 2021-10-06 DIAGNOSIS — I73.00 RAYNAUD'S DISEASE WITHOUT GANGRENE: ICD-10-CM

## 2021-10-06 PROCEDURE — 3017F COLORECTAL CA SCREEN DOC REV: CPT | Performed by: INTERNAL MEDICINE

## 2021-10-06 PROCEDURE — G8427 DOCREV CUR MEDS BY ELIG CLIN: HCPCS | Performed by: INTERNAL MEDICINE

## 2021-10-06 PROCEDURE — G8484 FLU IMMUNIZE NO ADMIN: HCPCS | Performed by: INTERNAL MEDICINE

## 2021-10-06 PROCEDURE — 4004F PT TOBACCO SCREEN RCVD TLK: CPT | Performed by: INTERNAL MEDICINE

## 2021-10-06 PROCEDURE — 99214 OFFICE O/P EST MOD 30 MIN: CPT | Performed by: INTERNAL MEDICINE

## 2021-10-06 PROCEDURE — G8420 CALC BMI NORM PARAMETERS: HCPCS | Performed by: INTERNAL MEDICINE

## 2021-10-06 RX ORDER — LOSARTAN POTASSIUM 50 MG/1
2 TABLET ORAL DAILY
COMMUNITY
Start: 2021-08-09 | End: 2021-10-06

## 2021-10-06 RX ORDER — METOPROLOL SUCCINATE 50 MG/1
1 TABLET, EXTENDED RELEASE ORAL DAILY
COMMUNITY
Start: 2021-09-07

## 2021-10-06 ASSESSMENT — ENCOUNTER SYMPTOMS
BLOOD IN STOOL: 0
SORE THROAT: 0
EYE PAIN: 0
COLOR CHANGE: 0
EYE ITCHING: 0
RHINORRHEA: 0
NAUSEA: 0
COUGH: 1
BACK PAIN: 1
SHORTNESS OF BREATH: 0
ABDOMINAL PAIN: 0

## 2021-10-06 NOTE — PROGRESS NOTES
Rheumatology Attending Note  10/6/2021 9:10 AM    Patient seen and examined independently by me. Face to face evaluation and examination performed. I Have discussed with resident physician Dr. Gordy Rees ([id number])  about this patient in detail and agree with her assessment and plan      Rheumatoid arthritis stable with no subsequent flares or new nodulosis formation. Ongoing Raynaud's with associated paresthesias. Continued tobacco dependence with smoking up to 1.5 packs/day. Denies respiratory symptoms, chest pain, tightness. Physical examination:  Tender second third MCP bilaterally, right fifth PIP and left fourth PIP. Color changes bilateral hands was cyanosis and pallor. Cool hands bilaterally. Mild nodule in the right elbow. Assessment/plan    Rheumatoid arthritis --mild active rheumatoid arthritis activity.  -Discussed recent updates of black box warning for [facility]. Patient reported understanding had no further questions.  -Continue methotrexate, folic acid and Xeljanz daily    rheumatoid nodulosis --no nodule right elbow. Improved status post initiation of therapy. Raynaud's: Stable. Discussed importance of smoking cessation. Medication monitoring: Repeat blood testing every 12 weeks     Tobacco dependence: Discussed the importance of smoking cessation given the patient's risk of coronary disease with his rheumatoid arthritis and medications use. risk of ILD, cancer, MACE, COPD.   - discussed attempting to make a time for smoking cessation or trying to decreased to . 0.75 ppd by the next visit.    - patient reported udnestanding     Shell Veloz DO,DO  Rheumatology

## 2021-10-06 NOTE — PROGRESS NOTES
[facility] UP NOTE     Date Of Service: 10/6/2021  Provider: Paulo Arango MD , DO  PCP: No primary care provider on file. Name: Rafi Rodríguez   MRN: [id number]    ASSESSMENT/PLAN:   1. Rheumatoid arthritis involving multiple sites with positive rheumatoid factor ([facility] [address]  2. Rheumatoid nodulosis (HCC)  3. Raynaud's disease without gangrene  4. Osteoarthritis of both hands, unspecified osteoarthritis type  5. Cigarette nicotine dependence with nicotine-induced disorder  6. Medication monitoring encounter    1. Rheumatoid Arthritis Involving Multiple Sites with Positive Rheumatoid Factor. Highly probable based on positive rheumatoid factor and presentation primarily in the metacarpal joints. Excellent control with no pain in his metacarpal joints, proximal interphalangeal joints, and distal interphalangeal joints at rest or with activity. However, mild tenderness without erythema, swelling, or warmth noted. Recent CRP and sedimentation rate in the normal range. Maintain on methotrexate plus folvite and Willistine Read. Patient was counseled about black box warming for Willistine Read and would still like to use it. Monitor with every six month CMP and CBC. 2. Rheumatoid Nodulosis. Improving secondary to treatment as above. 3. Raynaud's Disease without Gangrene. Counseled with regards to smoking cessation. 4. Osteoarthritis of both hands, unspecified. Maintain on Mobic 7.5 mg daily. Monitor kidney function with every six month CMP as above. 5. Nicotine Dependence. Counseled with regards to smoking cessation. Will maintain on Nicoderm patch and Chantix. No follow-ups on file.   New Prescriptions    No medications on file     History of Present Illness (HPI)     Chief Complaint   Patient presents with    Follow-up     7 month f/u BERNY Lane is a 60year old male with a past medical history of rheumatoid arthritis controlled with methotrexate and Willistine Read who we are seeing for a seven month follow up. Here for follow-up. No flares. Still having pain in his back which is unchanged. No pain in the hands, wrists, elbows, knees, or feet. No pain with use of any of these joints. Numbness/tingling in his fingertips along with purple/white color changes with cold. Notes pain in the tips of his fingers and stiffness in his hands as well with cold. Usually a couple of hours before they return to normal in the cold but can go faster if he warms them. States that his nodules are resolving. Takes medications consistently. No recent hospitalizations, no recent illnesses, no recent injuries, no recent falls. Occasional cramps behind the bilateral calfs. REVIEW OF SYSTEMS: (ROS)    Review of Systems   Constitutional: Negative for chills and fever. HENT: Negative for rhinorrhea and sore throat. Eyes: Negative for pain, itching and visual disturbance. Respiratory: Positive for cough. Negative for shortness of breath. Cardiovascular: Negative for chest pain, palpitations and leg swelling. Gastrointestinal: Negative for abdominal pain, blood in stool and nausea. Genitourinary: Negative for difficulty urinating, dysuria and hematuria. Musculoskeletal: Positive for back pain. Negative for neck pain. Skin: Negative for color change and rash. Neurological: Negative for dizziness and headaches. Psychiatric/Behavioral: Negative for dysphoric mood and self-injury. The patient is not nervous/anxious. PmHx:  has a past medical history of Rheumatoid arthritis ([facility] [address] Social History:  reports that he has been smoking cigarettes. He has a 17.50 pack-year smoking history. He has never used smokeless tobacco. He reports that he does not drink alcohol and does not use drugs.     ALLERGIES   No Known Allergies    CURRENT MEDICATIONS      Current Outpatient Medications:     metoprolol succinate (TOPROL XL) 50 MG extended release tablet, Take 1 tablet by mouth daily, Disp: , Rfl:     methotrexate (RHEUMATREX) 2.5 MG chemo tablet, Take 2 tablets by mouth once a week, Disp: 24 tablet, Rfl: 0    folic acid (FOLVITE) 1 MG tablet, take 2 tablets by mouth daily, Disp: 180 tablet, Rfl: 1    XELJANZ XR 11 MG TB24, take 1 tablet by mouth once daily, Disp: 30 tablet, Rfl: 11    meloxicam (MOBIC) 7.5 MG tablet, take 1 tablet once daily, Disp: 90 tablet, Rfl: 1    varenicline (CHANTIX) 0.5 MG tablet, Take 1-2 tablets by mouth See Admin Instructions 0.5mg DAILY for 3 days followed by 0.5mg TWICE DAILY for 4 days followed by 1mg TWICE DAILY, Disp: 57 tablet, Rfl: 0    triamcinolone (KENALOG) 0.1 % cream, Apply topically 2 times daily. , Disp: 80 g, Rfl: 1    losartan (COZAAR) 100 MG tablet, , Disp: , Rfl:     NIFEdipine (ADALAT CC) 30 MG extended release tablet, take 1 tablet by mouth once daily, Disp: 30 tablet, Rfl: 5    nicotine (NICODERM CQ) 21 MG/24HR, Place 1 patch onto the skin every 24 hours, Disp: 30 patch, Rfl: 3    PHYSICAL EXAMINATION / OBJECTIVE     Objective:  /84 (Site: Left Upper Arm, Position: Sitting, Cuff Size: Medium Adult)   Pulse 66   Ht 5' 7.01\" (1.702 m)   Wt 157 lb (71.2 kg)   SpO2 99%   BMI 24.58 kg/m²     Physical Exam  Constitutional:       Appearance: Normal appearance. He is normal weight. He is not ill-appearing. Comments: Tenderness noted in the all of the metacarpal joints, some of the proximal interphalangeal, and none of the distal interphalangeal.   No erythema, swelling, or warmth noted. HENT:      Head: Normocephalic and atraumatic. Right Ear: External ear normal.      Left Ear: External ear normal.      Mouth/Throat:      Mouth: Mucous membranes are moist.      Pharynx: Oropharynx is clear. Eyes:      Pupils: Pupils are equal, round, and reactive to light. Cardiovascular:      Rate and Rhythm: Normal rate and regular rhythm. Pulses: Normal pulses. Heart sounds: Normal heart sounds.    Pulmonary: Effort: Pulmonary effort is normal.      Breath sounds: Normal breath sounds. Musculoskeletal:         General: No swelling or deformity. Normal range of motion. Right lower leg: No edema. Left lower leg: No edema. Skin:     General: Skin is warm and dry. Capillary Refill: Capillary refill takes less than 2 seconds. Neurological:      Mental Status: He is alert. Psychiatric:         Mood and Affect: Mood normal.         Behavior: Behavior normal.          LABS      Lab Results   Component Value Date    WBC 6.3 09/28/2021    HGB 13.8 09/28/2021    .1 (H) 09/28/2021    MCHC 33.1 09/28/2021    RDW 13.7 09/28/2021     09/28/2021    NEUTROABS 4800 09/28/2021    LYMPHSABS 900 (L) 09/28/2021    EOSABS 100 09/28/2021    BASOSABS 0 09/28/2021         Chemistry        Component Value Date/Time     09/28/2021 0827    K 4.2 09/28/2021 0827     09/28/2021 0827    CO2 25 09/28/2021 0827    BUN 13 09/28/2021 0827    CREATININE 0.65 09/28/2021 0827        Component Value Date/Time    CALCIUM 8.70 (L) 09/28/2021 0827    ALKPHOS 68 09/28/2021 0827    AST 13 (L) 09/28/2021 0827    ALT 9 (L) 09/28/2021 0827    BILITOT 1.3 (H) 09/28/2021 0827          Lab Results   Component Value Date    SEDRATE 4 09/28/2021    SEDRATE 4 07/08/2021    SEDRATE 1 05/05/2021    CRP < 0.5 09/28/2021    CRP < 0.5 07/08/2021    CRP 0.7 05/05/2021       No results found for: VITD25    No results found for: ANASCRN  No results found for: SSA  No results found for: SSB  No results found for: ANTI-SMITH  No results found for: DSDNAAB   No results found for: ANTIRNP  No results found for: C3, C4  No results found for: CCPAB  Lab Results   Component Value Date    RF >10 11/02/2017         Electronically signed by Kuldeep Bhat MD, MPH, CPH on 10/6/21 at 8:11 AM EDT  Supervised by Dr. Garces Reading  Please contact the office if you have any questions or change of symptoms.

## 2021-11-02 ENCOUNTER — TELEPHONE (OUTPATIENT)
Dept: RHEUMATOLOGY | Age: 56
End: 2021-11-02

## 2021-11-02 NOTE — TELEPHONE ENCOUNTER
Patient needs to discuss with his PCP. If he is having shortness of breath we can have him see and order some baseline evaluation for possible. COPD given his know hx of Tobacco dependence. Has he noted any worsening of the SOB ? ?

## 2021-11-02 NOTE — TELEPHONE ENCOUNTER
DOLV=10-06-21 DONV=02-09-22. Sandy Yoder is calling to see if Dilma Nielson will give him a rx for central air for his trailer. Dx=arthritis, too hot cant breathe. Please call him when ready.

## 2021-12-22 LAB
A/G RATIO: 1.7 (ref 1.5–2.5)
ABSOLUTE BASO #: 0 /CMM (ref 0–200)
ABSOLUTE EOS #: 100 /CMM (ref 0–500)
ABSOLUTE LYMPH #: 1000 /CMM (ref 1000–4800)
ABSOLUTE MONO #: 600 /CMM (ref 0–800)
ABSOLUTE NEUT #: 5300 /CMM (ref 1800–7700)
ALBUMIN SERPL-MCNC: 4.5 GM/DL (ref 3.5–5)
ALP BLD-CCNC: 82 IU/L (ref 41–137)
ALT SERPL-CCNC: 14 IU/L (ref 10–40)
ANION GAP SERPL CALCULATED.3IONS-SCNC: 7 MMOL/L (ref 4–12)
AST SERPL-CCNC: 15 IU/L (ref 15–41)
BASOPHILS RELATIVE PERCENT: 0.3 % (ref 0–2)
BILIRUB SERPL-MCNC: 1.3 MG/DL (ref 0.2–1)
BUN BLDV-MCNC: 12 MG/DL (ref 7–20)
C-REACTIVE PROTEIN: 0.8 MG/DL
CALCIUM SERPL-MCNC: 9.2 MG/DL (ref 8.8–10.5)
CHLORIDE BLD-SCNC: 106 MEQ/L (ref 101–111)
CO2: 27 MEQ/L (ref 21–32)
CREAT SERPL-MCNC: 0.8 MG/DL (ref 0.6–1.3)
CREATININE CLEARANCE: >60
EOSINOPHILS RELATIVE PERCENT: 1.6 % (ref 0–6)
GLUCOSE: 104 MG/DL (ref 70–110)
HCT VFR BLD CALC: 44.1 % (ref 40–49)
HEMOGLOBIN: 14.8 GM/DL (ref 13.5–16.5)
LYMPHOCYTES RELATIVE PERCENT: 13.9 % (ref 15–45)
MCH RBC QN AUTO: 35.1 PG (ref 27.5–33)
MCHC RBC AUTO-ENTMCNC: 33.4 GM/DL (ref 33–36)
MCV RBC AUTO: 104.9 CU MIC (ref 80–97)
MONOCYTES RELATIVE PERCENT: 8.1 % (ref 2–10)
NEUTROPHILS RELATIVE PERCENT: 76.1 % (ref 40–70)
NUCLEATED RBCS: 0.1 /100 WBC
PDW BLD-RTO: 13.2 % (ref 12–16)
PLATELET # BLD: 306 TH/CMM (ref 150–400)
POTASSIUM SERPL-SCNC: 4.2 MEQ/L (ref 3.6–5)
RBC # BLD: 4.21 MIL/CMM (ref 4.5–6)
SEDIMENTATION RATE, ERYTHROCYTE: 7 MM/HR
SODIUM BLD-SCNC: 140 MEQ/L (ref 135–145)
TOTAL PROTEIN: 7.1 G/DL (ref 6.2–8)
WBC # BLD: 7 TH/CMM (ref 4.4–10.5)

## 2021-12-23 DIAGNOSIS — M05.79 RHEUMATOID ARTHRITIS INVOLVING MULTIPLE SITES WITH POSITIVE RHEUMATOID FACTOR (HCC): ICD-10-CM

## 2022-02-15 DIAGNOSIS — M05.79 RHEUMATOID ARTHRITIS INVOLVING MULTIPLE SITES WITH POSITIVE RHEUMATOID FACTOR (HCC): ICD-10-CM

## 2022-02-15 RX ORDER — FOLIC ACID 1 MG/1
2 TABLET ORAL DAILY
Qty: 180 TABLET | Refills: 1 | Status: SHIPPED | OUTPATIENT
Start: 2022-02-15 | End: 2022-08-15

## 2022-03-25 LAB
A/G RATIO: 1.7 (ref 1.5–2.5)
ABSOLUTE BASO #: 0 /CMM (ref 0–200)
ABSOLUTE EOS #: 100 /CMM (ref 0–500)
ABSOLUTE LYMPH #: 800 /CMM (ref 1000–4800)
ABSOLUTE MONO #: 600 /CMM (ref 0–800)
ABSOLUTE NEUT #: 4700 /CMM (ref 1800–7700)
ALBUMIN SERPL-MCNC: 4.2 GM/DL (ref 3.5–5)
ALP BLD-CCNC: 87 IU/L (ref 41–137)
ALT SERPL-CCNC: 10 IU/L (ref 10–40)
ANION GAP SERPL CALCULATED.3IONS-SCNC: 7 MMOL/L (ref 4–12)
AST SERPL-CCNC: 14 IU/L (ref 15–41)
BASOPHILS RELATIVE PERCENT: 0.3 % (ref 0–2)
BILIRUB SERPL-MCNC: 1.3 MG/DL (ref 0.2–1)
BUN BLDV-MCNC: 17 MG/DL (ref 7–20)
C-REACTIVE PROTEIN: 0.9 MG/DL
CALCIUM SERPL-MCNC: 9.3 MG/DL (ref 8.8–10.5)
CHLORIDE BLD-SCNC: 106 MEQ/L (ref 101–111)
CO2: 26 MEQ/L (ref 21–32)
CREAT SERPL-MCNC: 0.82 MG/DL (ref 0.6–1.3)
CREATININE CLEARANCE: >60
EOSINOPHILS RELATIVE PERCENT: 1 % (ref 0–6)
GLUCOSE: 96 MG/DL (ref 70–110)
HCT VFR BLD CALC: 42.4 % (ref 40–49)
HEMOGLOBIN: 14 GM/DL (ref 13.5–16.5)
LYMPHOCYTES RELATIVE PERCENT: 12.9 % (ref 15–45)
MCH RBC QN AUTO: 34.6 PG (ref 27.5–33)
MCHC RBC AUTO-ENTMCNC: 33.1 GM/DL (ref 33–36)
MCV RBC AUTO: 104.5 CU MIC (ref 80–97)
MONOCYTES RELATIVE PERCENT: 9.1 % (ref 2–10)
NEUTROPHILS RELATIVE PERCENT: 76.7 % (ref 40–70)
NUCLEATED RBCS: 0.1 /100 WBC
PDW BLD-RTO: 13.6 % (ref 12–16)
PLATELET # BLD: 264 TH/CMM (ref 150–400)
POTASSIUM SERPL-SCNC: 4.7 MEQ/L (ref 3.6–5)
RBC # BLD: 4.06 MIL/CMM (ref 4.5–6)
SEDIMENTATION RATE, ERYTHROCYTE: 5 MM/HR
SODIUM BLD-SCNC: 139 MEQ/L (ref 135–145)
TOTAL PROTEIN: 6.7 G/DL (ref 6.2–8)
WBC # BLD: 6.1 TH/CMM (ref 4.4–10.5)

## 2022-03-28 DIAGNOSIS — M05.79 RHEUMATOID ARTHRITIS INVOLVING MULTIPLE SITES WITH POSITIVE RHEUMATOID FACTOR (HCC): ICD-10-CM

## 2022-03-29 ENCOUNTER — TELEPHONE (OUTPATIENT)
Dept: RHEUMATOLOGY | Age: 57
End: 2022-03-29

## 2022-03-29 NOTE — TELEPHONE ENCOUNTER
----- Message from GUY Mensah CNP sent at 3/28/2022  4:26 PM EDT -----  Blood testing stable. Repeat labs in 12 weeks.

## 2022-04-04 ENCOUNTER — TELEPHONE (OUTPATIENT)
Dept: RHEUMATOLOGY | Age: 57
End: 2022-04-04

## 2022-04-04 NOTE — TELEPHONE ENCOUNTER
Received notification of temporary supply for Geni Cabrera. 2 RX cards on file. When initiated via cover my meds, [employer] responded with \"Member not found\"    Spoke with Pharmacy and they stated pt has [employer]. Gave this info:    BIN: [id number]  PATRICIAN: Richard Must: [id number]    Responded with \"Member not found\". Left message for patient to call with updated RX insurance info. This is the second message left.

## 2022-04-11 NOTE — TELEPHONE ENCOUNTER
Kathie Kirkpatrick is calling regarding this prior authorization. He stated that at this time it has been denied and they are needing additional information.

## 2022-04-12 NOTE — TELEPHONE ENCOUNTER
Patient calling in to check the status of this. Please call him to update, he has been without the medication for approx 10 days now.

## 2022-04-15 NOTE — TELEPHONE ENCOUNTER
Appeal started for patient's Krista Drew XR 11mg due to denial.  Included office notes and the fact the patient has been on this med since 2019. Marked urgent due to patient being out. Krista Drew was denied stating they asked for additional information. Additional information was requested on 4:08pm and received denial after 9pm the same day. Spoke with Wendy Couch from [place] on 4- from phone number that is indicated to call for verbal expedited appeal.  After almost an hour of being transferred and on hold he told me they do not do expedited appeals verbally. On fax cover sheet I noted this needs to be urgent. LM for patient updating him on info.

## 2022-04-18 NOTE — TELEPHONE ENCOUNTER
PA approval from 04/08/22-12/31/39    See copy scanned     Attempted to call Deniz Champ and inform; LM with details

## 2022-04-21 DIAGNOSIS — Z51.81 MEDICATION MONITORING ENCOUNTER: ICD-10-CM

## 2022-04-21 DIAGNOSIS — M05.79 RHEUMATOID ARTHRITIS INVOLVING MULTIPLE SITES WITH POSITIVE RHEUMATOID FACTOR (HCC): ICD-10-CM

## 2022-04-21 RX ORDER — TOFACITINIB 11 MG/1
TABLET, FILM COATED, EXTENDED RELEASE ORAL
Qty: 30 TABLET | Refills: 11 | Status: CANCELLED | OUTPATIENT
Start: 2022-04-21

## 2022-04-21 NOTE — TELEPHONE ENCOUNTER
Patient is calling regarding this and states that the pharmacy is telling him that they are still waiting on paperwork for this.   Please advise  [phone]

## 2022-05-09 ENCOUNTER — TELEPHONE (OUTPATIENT)
Dept: RHEUMATOLOGY | Age: 57
End: 2022-05-09

## 2022-05-09 NOTE — TELEPHONE ENCOUNTER
Dana Garcia from the [facility] dept left message to clarify pts insurance info. Called back and had to leave a voicemail.

## 2022-05-18 ENCOUNTER — OFFICE VISIT (OUTPATIENT)
Dept: RHEUMATOLOGY | Age: 57
End: 2022-05-18
Payer: MEDICARE

## 2022-05-18 VITALS
OXYGEN SATURATION: 100 % | HEIGHT: 67 IN | WEIGHT: 160 LBS | SYSTOLIC BLOOD PRESSURE: 134 MMHG | BODY MASS INDEX: 25.11 KG/M2 | HEART RATE: 69 BPM | DIASTOLIC BLOOD PRESSURE: 84 MMHG

## 2022-05-18 DIAGNOSIS — Z51.81 MEDICATION MONITORING ENCOUNTER: ICD-10-CM

## 2022-05-18 DIAGNOSIS — F17.219 CIGARETTE NICOTINE DEPENDENCE WITH NICOTINE-INDUCED DISORDER: ICD-10-CM

## 2022-05-18 DIAGNOSIS — M19.041 OSTEOARTHRITIS OF BOTH HANDS, UNSPECIFIED OSTEOARTHRITIS TYPE: ICD-10-CM

## 2022-05-18 DIAGNOSIS — M19.042 OSTEOARTHRITIS OF BOTH HANDS, UNSPECIFIED OSTEOARTHRITIS TYPE: ICD-10-CM

## 2022-05-18 DIAGNOSIS — M06.30 RHEUMATOID NODULOSIS (HCC): ICD-10-CM

## 2022-05-18 DIAGNOSIS — I73.00 RAYNAUD'S DISEASE WITHOUT GANGRENE: ICD-10-CM

## 2022-05-18 DIAGNOSIS — D72.810 LYMPHOPENIA: ICD-10-CM

## 2022-05-18 DIAGNOSIS — M05.79 RHEUMATOID ARTHRITIS INVOLVING MULTIPLE SITES WITH POSITIVE RHEUMATOID FACTOR (HCC): Primary | ICD-10-CM

## 2022-05-18 PROCEDURE — G8427 DOCREV CUR MEDS BY ELIG CLIN: HCPCS | Performed by: INTERNAL MEDICINE

## 2022-05-18 PROCEDURE — 3017F COLORECTAL CA SCREEN DOC REV: CPT | Performed by: INTERNAL MEDICINE

## 2022-05-18 PROCEDURE — 4004F PT TOBACCO SCREEN RCVD TLK: CPT | Performed by: INTERNAL MEDICINE

## 2022-05-18 PROCEDURE — G8419 CALC BMI OUT NRM PARAM NOF/U: HCPCS | Performed by: INTERNAL MEDICINE

## 2022-05-18 PROCEDURE — 99214 OFFICE O/P EST MOD 30 MIN: CPT | Performed by: INTERNAL MEDICINE

## 2022-05-18 ASSESSMENT — ENCOUNTER SYMPTOMS
COUGH: 1
NAUSEA: 0
COLOR CHANGE: 0
EYE ITCHING: 0
EYE PAIN: 0
SHORTNESS OF BREATH: 0
SORE THROAT: 0
RHINORRHEA: 0
ABDOMINAL PAIN: 0
BACK PAIN: 1
BLOOD IN STOOL: 0

## 2022-05-18 ASSESSMENT — JOINT PAIN
TOTAL NUMBER OF SWOLLEN JOINTS: 4
TOTAL NUMBER OF TENDER JOINTS: 4

## 2022-05-18 NOTE — PROGRESS NOTES
[facility] RHEUMATOLOGY FOLLOW UP NOTE     Date Of Service: 5/18/2022  Provider: Ralph Gonzalez DO, DO  PCP: No primary care provider on file. Name: Stuart Barthel   MRN: [id number]        History of Present Illness (HPI)     Chief Complaint   Patient presents with    Follow-up     7 month f/u RA      Anne Bosch is a 60year old male with a past medical history of rheumatoid arthritis controlled with methotrexate and Geni Cabrera who we are seeing for a seven month follow up. Off xeljanz x 6 weeks - increased pain, swelling, tiffness of the left > right hand. Pain up to 6/10 over the past week. No other areas of jotin pains or joint swelling reported. Raynaud's color changes continue to occur.  + Associated paresthesias. Tobacco dependnece - reports smokign up to 1.5 packs per day  THC use daily         REVIEW OF SYSTEMS: (ROS)    Review of Systems   Constitutional: Negative for chills and fever. HENT: Negative for rhinorrhea and sore throat. Eyes: Negative for pain, itching and visual disturbance. Respiratory: Positive for cough. Negative for shortness of breath. Cardiovascular: Negative for chest pain, palpitations and leg swelling. Gastrointestinal: Negative for abdominal pain, blood in stool and nausea. Genitourinary: Negative for difficulty urinating, dysuria and hematuria. Musculoskeletal: Positive for back pain. Negative for neck pain. Skin: Negative for color change and rash. Neurological: Negative for dizziness and headaches. Psychiatric/Behavioral: Negative for dysphoric mood and self-injury. The patient is not nervous/anxious. PmHx:  has a past medical history of Rheumatoid arthritis ([facility] [address] Social History:  reports that he has been smoking cigarettes. He has a 17.50 pack-year smoking history. He has never used smokeless tobacco. He reports that he does not drink alcohol and does not use drugs.     ALLERGIES   No Known Allergies    CURRENT MEDICATIONS Current Outpatient Medications:     folic acid (FOLVITE) 1 MG tablet, take 2 tablets by mouth daily, Disp: 180 tablet, Rfl: 1    metoprolol succinate (TOPROL XL) 50 MG extended release tablet, Take 1 tablet by mouth daily, Disp: , Rfl:     meloxicam (MOBIC) 7.5 MG tablet, take 1 tablet once daily, Disp: 90 tablet, Rfl: 1    varenicline (CHANTIX) 0.5 MG tablet, Take 1-2 tablets by mouth See Admin Instructions 0.5mg DAILY for 3 days followed by 0.5mg TWICE DAILY for 4 days followed by 1mg TWICE DAILY, Disp: 57 tablet, Rfl: 0    triamcinolone (KENALOG) 0.1 % cream, Apply topically 2 times daily. , Disp: 80 g, Rfl: 1    losartan (COZAAR) 100 MG tablet, , Disp: , Rfl:     NIFEdipine (ADALAT CC) 30 MG extended release tablet, take 1 tablet by mouth once daily, Disp: 30 tablet, Rfl: 5    XELJANZ XR 11 MG TB24, take 1 tablet by mouth once daily (Patient not taking: Reported on 5/18/2022), Disp: 30 tablet, Rfl: 11    nicotine (NICODERM CQ) 21 MG/24HR, Place 1 patch onto the skin every 24 hours, Disp: 30 patch, Rfl: 3    PHYSICAL EXAMINATION / OBJECTIVE     Objective:  /84 (Site: Right Lower Arm, Position: Sitting, Cuff Size: Medium Adult)   Pulse 69   Ht 5' 7.01\" (1.702 m)   Wt 160 lb (72.6 kg)   SpO2 100%   BMI 25.05 kg/m²     Physical Exam  Constitutional:       Appearance: Normal appearance. He is normal weight. He is not ill-appearing. HENT:      Head: Normocephalic and atraumatic. Right Ear: External ear normal.      Left Ear: External ear normal.      Mouth/Throat:      Mouth: Mucous membranes are moist.      Pharynx: Oropharynx is clear. Eyes:      Pupils: Pupils are equal, round, and reactive to light. Cardiovascular:      Rate and Rhythm: Normal rate and regular rhythm. Pulses: Normal pulses. Heart sounds: Normal heart sounds. Pulmonary:      Effort: Pulmonary effort is normal.      Breath sounds: Normal breath sounds.    Musculoskeletal:         General: Swelling and tenderness present. No deformity. Normal range of motion. Right lower leg: No edema. Left lower leg: No edema. Comments: Inability to make a composite fist w/ the left hand   Skin:     General: Skin is warm and dry. Capillary Refill: Capillary refill takes less than 2 seconds. Neurological:      Mental Status: He is alert. Psychiatric:         Mood and Affect: Mood normal.         Behavior: Behavior normal.     Physical Exam        LOWRY-28 (ESR): 2.89 (Low disease activity)  LOWRY-28 (CRP): 3.55 (Moderate disease activity)            LABS      Lab Results   Component Value Date    WBC 6.1 03/25/2022    HGB 14.0 03/25/2022    .5 (H) 03/25/2022    MCHC 33.1 03/25/2022    RDW 13.6 03/25/2022     03/25/2022    NEUTROABS 4700 03/25/2022    LYMPHSABS 800 (L) 03/25/2022    EOSABS 100 03/25/2022    BASOSABS 0 03/25/2022         Chemistry        Component Value Date/Time     03/25/2022 0829    K 4.7 03/25/2022 0829     03/25/2022 0829    CO2 26 03/25/2022 0829    BUN 17 03/25/2022 0829    CREATININE 0.82 03/25/2022 0829        Component Value Date/Time    CALCIUM 9.30 03/25/2022 0829    ALKPHOS 87 03/25/2022 0829    AST 14 (L) 03/25/2022 0829    ALT 10 03/25/2022 0829    BILITOT 1.3 (H) 03/25/2022 0829          Lab Results   Component Value Date    SEDRATE 5 03/25/2022    SEDRATE 7 12/22/2021    SEDRATE 4 09/28/2021    CRP 0.9 03/25/2022    CRP 0.8 12/22/2021    CRP < 0.5 09/28/2021       Lab Results   Component Value Date    RF >10 11/02/2017         ASSESSMENT/PLAN:     Rheumatoid arthritis  Rheumatoid nodulosis- resolved.    - + RF, IgG, IgM > 100, IgA > 100, nodulosis, + synovitis  - prior tx: plaquenil (not effective), sulfasalazine (weakness and GI upset), arava (LFT elevation)               - xeljanz 11 mg daily               - methotrexate  5mg q wk (down from 10mg in 2021)                - folic acid 2 mg daily               - mobic 7.5 mg daily PRN     Lymphopenia: Question medication related reevaluation needed. Concern for medication related. May discontinue methotrexate if the lymphopenia persist.     Raynauds- stable - - continue procardia 30 mg daily     Bilateral hand osteoarthritis -  continue mobic daily PRN     Nicotine dependence               - smoking cessation previously discussed on multiple occasions   Previously attempted Chantix. patient never completed  lung cancer screening CT ordered 3/25/2021     Medication monitoring -  CBC, CMP, sed rate, CRP q 12 weeks.

## 2022-05-20 ENCOUNTER — TELEPHONE (OUTPATIENT)
Dept: RHEUMATOLOGY | Age: 57
End: 2022-05-20

## 2022-05-20 DIAGNOSIS — Z51.81 MEDICATION MONITORING ENCOUNTER: Primary | ICD-10-CM

## 2022-05-20 LAB
A/G RATIO: 1.4 (ref 1.5–2.5)
ABSOLUTE BASO #: 0 /CMM (ref 0–200)
ABSOLUTE EOS #: 100 /CMM (ref 0–500)
ABSOLUTE LYMPH #: 900 /CMM (ref 1000–4800)
ABSOLUTE MONO #: 500 /CMM (ref 0–800)
ABSOLUTE NEUT #: 3800 /CMM (ref 1800–7700)
ALBUMIN SERPL-MCNC: 4 G/DL (ref 3.5–5)
ALP BLD-CCNC: 90 IU/L (ref 41–137)
ALT SERPL-CCNC: 11 IU/L (ref 10–40)
ANION GAP SERPL CALCULATED.3IONS-SCNC: 7 MMOL/L (ref 4–12)
AST SERPL-CCNC: 14 IU/L (ref 15–41)
BASOPHILS RELATIVE PERCENT: 0.6 % (ref 0–2)
BILIRUB SERPL-MCNC: 1.4 MG/DL (ref 0.2–1)
BUN BLDV-MCNC: 10 MG/DL (ref 7–20)
C-REACTIVE PROTEIN: 1 MG/DL
CALCIUM SERPL-MCNC: 9.1 MG/DL (ref 8.8–10.5)
CHLORIDE BLD-SCNC: 108 MEQ/L (ref 101–111)
CO2: 24 MEQ/L (ref 21–32)
CREAT SERPL-MCNC: 0.77 MG/DL (ref 0.6–1.3)
CREATININE CLEARANCE: >60
EOSINOPHILS RELATIVE PERCENT: 1.5 % (ref 0–6)
GLUCOSE: 93 MG/DL (ref 70–110)
HCT VFR BLD CALC: 40.8 % (ref 40–49)
HEMOGLOBIN: 13.7 GM/DL (ref 13.5–16.5)
LYMPHOCYTES RELATIVE PERCENT: 16.3 % (ref 15–45)
MCH RBC QN AUTO: 34.4 PG (ref 27.5–33)
MCHC RBC AUTO-ENTMCNC: 33.5 GM/DL (ref 33–36)
MCV RBC AUTO: 102.9 CU MIC (ref 80–97)
MONOCYTES RELATIVE PERCENT: 9.8 % (ref 2–10)
NEUTROPHILS RELATIVE PERCENT: 71.8 % (ref 40–70)
NUCLEATED RBCS: 0.2 /100 WBC
PDW BLD-RTO: 13.2 % (ref 12–16)
PLATELET # BLD: 280 TH/CMM (ref 150–400)
POTASSIUM SERPL-SCNC: 4.4 MEQ/L (ref 3.6–5)
RBC # BLD: 3.97 MIL/CMM (ref 4.5–6)
SEDIMENTATION RATE, ERYTHROCYTE: 16 MM/HR
SODIUM BLD-SCNC: 139 MEQ/L (ref 135–145)
TOTAL PROTEIN: 6.8 G/DL (ref 6.2–8)
WBC # BLD: 5.2 TH/CMM (ref 4.4–10.5)

## 2022-05-20 NOTE — TELEPHONE ENCOUNTER
Patient is calling to request his lab orders be sent to [address] [place] [address]  Phone # [phone]  fax number is [phone]

## 2022-05-23 ENCOUNTER — TELEPHONE (OUTPATIENT)
Dept: RHEUMATOLOGY | Age: 57
End: 2022-05-23

## 2022-05-23 DIAGNOSIS — M05.79 RHEUMATOID ARTHRITIS INVOLVING MULTIPLE SITES WITH POSITIVE RHEUMATOID FACTOR (HCC): ICD-10-CM

## 2022-05-23 NOTE — TELEPHONE ENCOUNTER
----- Message from GUY Mix CNP sent at 5/23/2022 11:00 AM EDT -----  Blood testing is stable. Medications refilled. Repeat labs in 12 weeks.

## 2022-07-12 ENCOUNTER — TELEPHONE (OUTPATIENT)
Dept: RHEUMATOLOGY | Age: 57
End: 2022-07-12

## 2022-07-13 ENCOUNTER — TELEPHONE (OUTPATIENT)
Dept: RHEUMATOLOGY | Age: 57
End: 2022-07-13

## 2022-07-13 DIAGNOSIS — Z51.81 MEDICATION MONITORING ENCOUNTER: Primary | ICD-10-CM

## 2022-07-13 NOTE — TELEPHONE ENCOUNTER
Nubiaallen Bradshaw is calling to get a new standing order for blood work faxed to [facility], his old one has . He is going to get his blood work drawn 07-15, please fax new order over.

## 2022-07-15 LAB
A/G RATIO: 1.5 (ref 1.5–2.5)
ABSOLUTE BASO #: 0 /CMM (ref 0–200)
ABSOLUTE EOS #: 0 /CMM (ref 0–500)
ABSOLUTE LYMPH #: 800 /CMM (ref 1000–4800)
ABSOLUTE MONO #: 500 /CMM (ref 0–800)
ABSOLUTE NEUT #: 4300 /CMM (ref 1800–7700)
ALBUMIN SERPL-MCNC: 4 G/DL (ref 3.5–5)
ALP BLD-CCNC: 86 IU/L (ref 41–137)
ALT SERPL-CCNC: 9 IU/L (ref 10–40)
ANION GAP SERPL CALCULATED.3IONS-SCNC: 6 MMOL/L (ref 4–12)
AST SERPL-CCNC: 13 IU/L (ref 15–41)
BASOPHILS RELATIVE PERCENT: 0.5 % (ref 0–2)
BILIRUB SERPL-MCNC: 1.5 MG/DL (ref 0.2–1)
BUN BLDV-MCNC: 10 MG/DL (ref 7–20)
C-REACTIVE PROTEIN: < 0.5 MG/DL
CALCIUM SERPL-MCNC: 9.1 MG/DL (ref 8.8–10.5)
CHLORIDE BLD-SCNC: 105 MEQ/L (ref 101–111)
CO2: 27 MEQ/L (ref 21–32)
CREAT SERPL-MCNC: 0.91 MG/DL (ref 0.6–1.3)
CREATININE CLEARANCE: >60
EOSINOPHILS RELATIVE PERCENT: 0.8 % (ref 0–6)
GLUCOSE: 94 MG/DL (ref 70–110)
HCT VFR BLD CALC: 41.5 % (ref 40–49)
HEMOGLOBIN: 14.2 GM/DL (ref 13.5–16.5)
HYPOCHROMIA: ABNORMAL
LYMPHOCYTES RELATIVE PERCENT: 14.1 % (ref 15–45)
MACROCYTOSIS: ABNORMAL
MCH RBC QN AUTO: 36 PG (ref 27.5–33)
MCHC RBC AUTO-ENTMCNC: 34.2 GM/DL (ref 33–36)
MCV RBC AUTO: 105.2 CU MIC (ref 80–97)
MONOCYTES RELATIVE PERCENT: 9.4 % (ref 2–10)
NEUTROPHILS RELATIVE PERCENT: 75.2 % (ref 40–70)
NUCLEATED RBCS: 0.1 /100 WBC
PDW BLD-RTO: 13.3 % (ref 12–16)
PLATELET # BLD: 239 TH/CMM (ref 150–400)
POTASSIUM SERPL-SCNC: 4.7 MEQ/L (ref 3.6–5)
RBC # BLD: 3.94 MIL/CMM (ref 4.5–6)
SEDIMENTATION RATE, ERYTHROCYTE: 4 MM/HR
SODIUM BLD-SCNC: 138 MEQ/L (ref 135–145)
TOTAL PROTEIN: 6.7 G/DL (ref 6.2–8)
WBC # BLD: 5.7 TH/CMM (ref 4.4–10.5)

## 2022-07-18 ENCOUNTER — TELEPHONE (OUTPATIENT)
Dept: RHEUMATOLOGY | Age: 57
End: 2022-07-18

## 2022-07-18 DIAGNOSIS — R79.89 CREATININE ELEVATION: Primary | ICD-10-CM

## 2022-07-18 NOTE — PROGRESS NOTES
Diagnosis Orders   1. Creatinine elevation  Basic Metabolic Panel    Urinalysis    Protein / creatinine ratio, urine        -- hold Methotrexate and mobic  -- repeat labs in 2 weeks.

## 2022-07-18 NOTE — TELEPHONE ENCOUNTER
----- Message from Harinder Ron DO sent at 7/18/2022  7:33 AM EDT -----  The kidney function tests are worsening. Please discontinue the meloxicam and hold methotrexate for the next 2 weeks. We will need to reevaluate your kidney function test at the end of the 2 weeks.

## 2022-08-03 DIAGNOSIS — M05.79 RHEUMATOID ARTHRITIS INVOLVING MULTIPLE SITES WITH POSITIVE RHEUMATOID FACTOR (HCC): ICD-10-CM

## 2022-08-03 LAB
A/G RATIO: 1.5 (ref 1.5–2.5)
ABSOLUTE BASO #: 0 /CMM (ref 0–200)
ABSOLUTE EOS #: 100 /CMM (ref 0–500)
ABSOLUTE LYMPH #: 800 /CMM (ref 1000–4800)
ABSOLUTE MONO #: 600 /CMM (ref 0–800)
ABSOLUTE NEUT #: 2800 /CMM (ref 1800–7700)
ALBUMIN SERPL-MCNC: 4.1 G/DL (ref 3.5–5)
ALP BLD-CCNC: 87 IU/L (ref 41–137)
ALT SERPL-CCNC: 10 IU/L (ref 10–40)
ANION GAP SERPL CALCULATED.3IONS-SCNC: 5 MMOL/L (ref 4–12)
AST SERPL-CCNC: 13 IU/L (ref 15–41)
BASOPHILS RELATIVE PERCENT: 0.9 % (ref 0–2)
BILIRUB SERPL-MCNC: 1.4 MG/DL (ref 0.2–1)
BUN BLDV-MCNC: 13 MG/DL (ref 7–20)
C-REACTIVE PROTEIN: 1.5 MG/DL
CALCIUM SERPL-MCNC: 9.1 MG/DL (ref 8.8–10.5)
CHLORIDE BLD-SCNC: 109 MEQ/L (ref 101–111)
CO2: 25 MEQ/L (ref 21–32)
CREAT SERPL-MCNC: 0.87 MG/DL (ref 0.6–1.3)
CREATININE CLEARANCE: >60
EOSINOPHILS RELATIVE PERCENT: 2 % (ref 0–6)
GLUCOSE: 97 MG/DL (ref 70–110)
HCT VFR BLD CALC: 42 % (ref 40–49)
HEMOGLOBIN: 13.7 GM/DL (ref 13.5–16.5)
LYMPHOCYTES RELATIVE PERCENT: 19 % (ref 15–45)
MACROCYTOSIS: ABNORMAL
MCH RBC QN AUTO: 34.7 PG (ref 27.5–33)
MCHC RBC AUTO-ENTMCNC: 32.8 GM/DL (ref 33–36)
MCV RBC AUTO: 106 CU MIC (ref 80–97)
MONOCYTES RELATIVE PERCENT: 13.3 % (ref 2–10)
NEUTROPHILS RELATIVE PERCENT: 64.8 % (ref 40–70)
NUCLEATED RBCS: 0.1 /100 WBC
PDW BLD-RTO: 12.8 % (ref 12–16)
PLATELET # BLD: 244 TH/CMM (ref 150–400)
POTASSIUM SERPL-SCNC: 4.4 MEQ/L (ref 3.6–5)
RBC # BLD: 3.96 MIL/CMM (ref 4.5–6)
SEDIMENTATION RATE, ERYTHROCYTE: 16 MM/HR
SODIUM BLD-SCNC: 139 MEQ/L (ref 135–145)
TOTAL PROTEIN: 6.8 G/DL (ref 6.2–8)
WBC # BLD: 4.3 TH/CMM (ref 4.4–10.5)

## 2022-08-04 ENCOUNTER — TELEPHONE (OUTPATIENT)
Dept: RHEUMATOLOGY | Age: 57
End: 2022-08-04

## 2022-08-04 NOTE — TELEPHONE ENCOUNTER
----- Message from GUY Strange CNP sent at 8/4/2022  8:51 AM EDT -----  Blood counts with a mildly low white blood cell count. Awaiting results of liver and kidneys.

## 2022-08-04 NOTE — TELEPHONE ENCOUNTER
----- Message from Lara Lr DO sent at 8/3/2022  8:03 PM EDT -----  There is a mild elevation of the C-reactive protein (a test for inflammation) . The other labs ar stable. Please have your labs repeated as scheduled in 12 weeks.

## 2022-08-14 DIAGNOSIS — M05.79 RHEUMATOID ARTHRITIS INVOLVING MULTIPLE SITES WITH POSITIVE RHEUMATOID FACTOR (HCC): ICD-10-CM

## 2022-08-15 RX ORDER — FOLIC ACID 1 MG/1
2 TABLET ORAL DAILY
Qty: 180 TABLET | Refills: 1 | Status: SHIPPED | OUTPATIENT
Start: 2022-08-15 | End: 2022-09-22

## 2022-09-22 ENCOUNTER — OFFICE VISIT (OUTPATIENT)
Dept: RHEUMATOLOGY | Age: 57
End: 2022-09-22
Payer: COMMERCIAL

## 2022-09-22 VITALS
HEART RATE: 63 BPM | SYSTOLIC BLOOD PRESSURE: 154 MMHG | BODY MASS INDEX: 24.14 KG/M2 | HEIGHT: 67 IN | WEIGHT: 153.8 LBS | OXYGEN SATURATION: 89 % | DIASTOLIC BLOOD PRESSURE: 88 MMHG

## 2022-09-22 DIAGNOSIS — M05.79 RHEUMATOID ARTHRITIS INVOLVING MULTIPLE SITES WITH POSITIVE RHEUMATOID FACTOR (HCC): Primary | ICD-10-CM

## 2022-09-22 DIAGNOSIS — Z51.81 MEDICATION MONITORING ENCOUNTER: ICD-10-CM

## 2022-09-22 DIAGNOSIS — M06.30 RHEUMATOID NODULOSIS (HCC): ICD-10-CM

## 2022-09-22 DIAGNOSIS — M19.042 OSTEOARTHRITIS OF BOTH HANDS, UNSPECIFIED OSTEOARTHRITIS TYPE: ICD-10-CM

## 2022-09-22 DIAGNOSIS — F17.219 CIGARETTE NICOTINE DEPENDENCE WITH NICOTINE-INDUCED DISORDER: ICD-10-CM

## 2022-09-22 DIAGNOSIS — M19.041 OSTEOARTHRITIS OF BOTH HANDS, UNSPECIFIED OSTEOARTHRITIS TYPE: ICD-10-CM

## 2022-09-22 DIAGNOSIS — I73.00 RAYNAUD'S DISEASE WITHOUT GANGRENE: ICD-10-CM

## 2022-09-22 DIAGNOSIS — D72.810 LYMPHOPENIA: ICD-10-CM

## 2022-09-22 PROCEDURE — 99214 OFFICE O/P EST MOD 30 MIN: CPT | Performed by: NURSE PRACTITIONER

## 2022-09-22 RX ORDER — ADHESIVE BANDAGE 3/4"
BANDAGE TOPICAL
Qty: 1 EACH | Refills: 0 | Status: SHIPPED | OUTPATIENT
Start: 2022-09-22

## 2022-09-22 ASSESSMENT — ENCOUNTER SYMPTOMS
BACK PAIN: 0
DIARRHEA: 0
SHORTNESS OF BREATH: 0
COUGH: 0
CONSTIPATION: 0
TROUBLE SWALLOWING: 0
EYE ITCHING: 0
NAUSEA: 0
EYE PAIN: 0
ABDOMINAL PAIN: 0

## 2022-09-22 NOTE — PROGRESS NOTES
[facility] RHEUMATOLOGY FOLLOW UP NOTE       Date Of Service: 9/22/2022  Provider: GUY Crawford - DES    Name: Mayte Art   MRN: [id number]    Chief Complaint(s)     Chief Complaint   Patient presents with    Follow-up     4 month bottom of feet   knees        History of Present Illness (HPI)     Mayte Art  is a(n)57 y.o. male with a hx of rheumatoid arthritis w/ nodulris here for the f/u evaluation of rheumatoid arthritis     Interval hx:    - stopped the xeljanz instead of the methotrexate with increased joint pains    + joint pains in the knees, feet, back, neck. 6-7/10. Aggravated by inactivity and improved by activity. Associated symptoms:  denies swelling/  Redness/ warmth, denies AM stiffness    REVIEW OF SYSTEMS: (ROS)    Review of Systems   Constitutional:  Negative for fatigue, fever and unexpected weight change. HENT:  Negative for congestion and trouble swallowing. Eyes:  Negative for pain and itching. Respiratory:  Negative for cough and shortness of breath. Cardiovascular:  Negative for chest pain and leg swelling. Gastrointestinal:  Negative for abdominal pain, constipation, diarrhea and nausea. Endocrine: Negative for cold intolerance and heat intolerance. Genitourinary:  Negative for difficulty urinating, frequency and urgency. Musculoskeletal:  Negative for arthralgias, back pain and joint swelling. Skin:  Negative for rash. Neurological:  Negative for dizziness, weakness, numbness and headaches. Psychiatric/Behavioral:  The patient is not nervous/anxious.       PAST MEDICAL HISTORY      Past Medical History:   Diagnosis Date    Rheumatoid arthritis ([address]        PAST SURGICAL HISTORY      Past Surgical History:   Procedure Laterality Date    ROTATOR CUFF REPAIR Right     shoulder       FAMILY HISTORY      Family History   Problem Relation Age of Onset    High Blood Pressure Mother     Kidney Disease Father        SOCIAL HISTORY      Social History Tobacco History       Smoking Status  Current Every Day Smoker Smoking Frequency  0.5 packs/day for 35 years (17.5 pk yrs) Smoking Tobacco Type  Cigarettes      Smokeless Tobacco Use  Never Used              Alcohol History       Alcohol Use Status  No              Drug Use       Drug Use Status  No              Sexual Activity       Sexually Active  Not Asked                    ALLERGIES   No Known Allergies    CURRENT MEDICATIONS      Current Outpatient Medications   Medication Sig Dispense Refill    folic acid (FOLVITE) 1 MG tablet take 2 tablets by mouth daily 180 tablet 1    methotrexate (RHEUMATREX) 2.5 MG chemo tablet Take 2 tablets by mouth once a week 24 tablet 0    metoprolol succinate (TOPROL XL) 50 MG extended release tablet Take 1 tablet by mouth daily      meloxicam (MOBIC) 7.5 MG tablet take 1 tablet once daily 90 tablet 1    losartan (COZAAR) 100 MG tablet       NIFEdipine (ADALAT CC) 30 MG extended release tablet take 1 tablet by mouth once daily 30 tablet 5    XELJANZ XR 11 MG TB24 take 1 tablet by mouth once daily (Patient not taking: No sig reported) 30 tablet 11    varenicline (CHANTIX) 0.5 MG tablet Take 1-2 tablets by mouth See Admin Instructions 0.5mg DAILY for 3 days followed by 0.5mg TWICE DAILY for 4 days followed by 1mg TWICE DAILY (Patient not taking: Reported on 9/22/2022) 57 tablet 0    triamcinolone (KENALOG) 0.1 % cream Apply topically 2 times daily. (Patient not taking: Reported on 9/22/2022) 80 g 1    nicotine (NICODERM CQ) 21 MG/24HR Place 1 patch onto the skin every 24 hours 30 patch 3     No current facility-administered medications for this visit. PHYSICAL EXAMINATION / OBJECTIVE   Objective:  BP (!) 154/88 (Site: Left Upper Arm, Position: Sitting, Cuff Size: Medium Adult)   Pulse 63   Ht 5' 7.01\" (1.702 m)   Wt 153 lb 12.8 oz (69.8 kg)   SpO2 (!) 89%   BMI 24.08 kg/m²     Physical Exam  Vitals reviewed. Constitutional:       Appearance: He is well-developed. Value Date    RF >10 11/02/2017       No components found for: CANCASCRN, APANCASCRN  Lab Results   Component Value Date    SEDRATE 16 08/03/2022     Lab Results   Component Value Date    CRP 1.5 (H) 08/03/2022       RADIOLOGY:         ASSESSMENT/PLAN    Assessment   Plan     Rheumatoid arthritis  Rheumatoid nodulosis- resolved  - + RF, IgG, IgM > 100, IgA > 100, nodulosis, + synovitis  - prior tx: plaquenil (not effective), sulfasalazine (weakness and GI upset), arava (LFT elevation)   - restart xeljanz 11 mg daily   - methotrexate 5 mg PO weekly & folic acid 2 mg daily- holding on restart due to leukopenia on recent lab evaluation   - mobic 7.5 mg daily PRN    Leukopenia  Lymphopenia   - holding methotrexate   - repeat labs in 4 weeks    Bilateral hand osteoarthritis- continue mobic daily PRN    Raynauds- stable- continue procardia 30 mg daily    Nicotine dependence  - discussed risk of tobacco use including COPD, Cancer, CAD   - smoking cessation again discussed with patient    Medication monitoring   - CBC, CMP, sed rate, CRP q 12 weeks      No follow-ups on file. Electronically signed by GUY Canales CNP on 9/22/2022 at 9:58 AM    New Prescriptions    No medications on file       Thank you for allowing me to participate in the care of this patient. Please call if there are any questions.

## 2022-10-02 DIAGNOSIS — M05.79 RHEUMATOID ARTHRITIS INVOLVING MULTIPLE SITES WITH POSITIVE RHEUMATOID FACTOR (HCC): ICD-10-CM

## 2022-10-02 DIAGNOSIS — Z51.81 MEDICATION MONITORING ENCOUNTER: ICD-10-CM

## 2022-10-03 RX ORDER — TOFACITINIB 11 MG/1
TABLET, FILM COATED, EXTENDED RELEASE ORAL
Qty: 30 TABLET | Refills: 11 | Status: ACTIVE | OUTPATIENT
Start: 2022-10-03

## 2022-10-26 ENCOUNTER — TELEPHONE (OUTPATIENT)
Dept: RHEUMATOLOGY | Age: 57
End: 2022-10-26

## 2022-10-26 DIAGNOSIS — M05.79 RHEUMATOID ARTHRITIS INVOLVING MULTIPLE SITES WITH POSITIVE RHEUMATOID FACTOR (HCC): ICD-10-CM

## 2022-10-26 NOTE — TELEPHONE ENCOUNTER
Isaac Marcum is calling stating that the place he used to go to get blood work, isnt drawing blood anymore. He needs new order faxed to Dr Yasmin Barboza office in [facility] [address]. Please call him when order is sent.

## 2022-11-14 LAB
ALBUMIN SERPL-MCNC: 4.4 G/DL
ALP BLD-CCNC: 103 U/L
ALT SERPL-CCNC: 17 U/L
ANION GAP SERPL CALCULATED.3IONS-SCNC: NORMAL MMOL/L
AST SERPL-CCNC: 18 U/L
BASOPHILS ABSOLUTE: 0 /ΜL
BASOPHILS RELATIVE PERCENT: 0.4 %
BILIRUB SERPL-MCNC: 1.2 MG/DL (ref 0.1–1.4)
BUN BLDV-MCNC: 11 MG/DL
C-REACTIVE PROTEIN: 3
CALCIUM SERPL-MCNC: 9.1 MG/DL
CHLORIDE BLD-SCNC: 104 MMOL/L
CO2: 27 MMOL/L
CREAT SERPL-MCNC: 0.72 MG/DL
EOSINOPHILS ABSOLUTE: 100 /ΜL
EOSINOPHILS RELATIVE PERCENT: 0.9 %
GFR CALCULATED: 60
GLUCOSE BLD-MCNC: 106 MG/DL
HCT VFR BLD CALC: 44.4 % (ref 41–53)
HEMOGLOBIN: 14.6 G/DL (ref 13.5–17.5)
LYMPHOCYTES ABSOLUTE: 900 /ΜL
LYMPHOCYTES RELATIVE PERCENT: 13.6 %
MCH RBC QN AUTO: 34 PG
MCHC RBC AUTO-ENTMCNC: 32.8 G/DL
MCV RBC AUTO: 104 FL
MONOCYTES ABSOLUTE: 500 /ΜL
MONOCYTES RELATIVE PERCENT: 7.1 %
NEUTROPHILS ABSOLUTE: 5300 /ΜL
NEUTROPHILS RELATIVE PERCENT: 78 %
PDW BLD-RTO: 12.3 %
PLATELET # BLD: 297 K/ΜL
PMV BLD AUTO: NORMAL FL
POTASSIUM SERPL-SCNC: 4.9 MMOL/L
RBC # BLD: 4.29 10^6/ΜL
SEDIMENTATION RATE, ERYTHROCYTE: NORMAL
SODIUM BLD-SCNC: 140 MMOL/L
TOTAL PROTEIN: 4.4
WBC # BLD: 6.8 10^3/ML

## 2022-11-16 ENCOUNTER — TELEPHONE (OUTPATIENT)
Dept: RHEUMATOLOGY | Age: 57
End: 2022-11-16

## 2022-11-16 NOTE — TELEPHONE ENCOUNTER
----- Message from GUY Arciniega CNP sent at 11/15/2022  4:32 PM EST -----  Blood testing with a mildly low lymphocyte count but no other significant abnormalities. The total white blood cell count has improved with holding the methotrexate.

## 2023-01-03 ENCOUNTER — TELEPHONE (OUTPATIENT)
Dept: RHEUMATOLOGY | Age: 58
End: 2023-01-03

## 2023-01-03 DIAGNOSIS — Z51.81 MEDICATION MONITORING ENCOUNTER: Primary | ICD-10-CM

## 2023-01-03 NOTE — TELEPHONE ENCOUNTER
Patient is requesting to have his lab orders sent to the location in [place] where he usually gets them done. He could not remember the name. He asked if he could get a call to notify him when this is done. He is planning to get them drawn tomorrow.

## 2023-02-20 DIAGNOSIS — M05.79 RHEUMATOID ARTHRITIS INVOLVING MULTIPLE SITES WITH POSITIVE RHEUMATOID FACTOR (HCC): ICD-10-CM

## 2023-02-20 RX ORDER — FOLIC ACID 1 MG/1
2 TABLET ORAL DAILY
Qty: 180 TABLET | Refills: 1 | OUTPATIENT
Start: 2023-02-20

## 2023-02-28 ENCOUNTER — OFFICE VISIT (OUTPATIENT)
Dept: RHEUMATOLOGY | Age: 58
End: 2023-02-28
Payer: COMMERCIAL

## 2023-02-28 VITALS
OXYGEN SATURATION: 97 % | WEIGHT: 160.2 LBS | HEIGHT: 67 IN | BODY MASS INDEX: 25.15 KG/M2 | HEART RATE: 67 BPM | DIASTOLIC BLOOD PRESSURE: 80 MMHG | SYSTOLIC BLOOD PRESSURE: 138 MMHG

## 2023-02-28 DIAGNOSIS — M19.041 OSTEOARTHRITIS OF BOTH HANDS, UNSPECIFIED OSTEOARTHRITIS TYPE: ICD-10-CM

## 2023-02-28 DIAGNOSIS — M19.042 OSTEOARTHRITIS OF BOTH HANDS, UNSPECIFIED OSTEOARTHRITIS TYPE: ICD-10-CM

## 2023-02-28 DIAGNOSIS — Z51.81 MEDICATION MONITORING ENCOUNTER: ICD-10-CM

## 2023-02-28 DIAGNOSIS — M05.79 RHEUMATOID ARTHRITIS INVOLVING MULTIPLE SITES WITH POSITIVE RHEUMATOID FACTOR (HCC): Primary | ICD-10-CM

## 2023-02-28 DIAGNOSIS — M06.30 RHEUMATOID NODULOSIS (HCC): ICD-10-CM

## 2023-02-28 DIAGNOSIS — I73.00 RAYNAUD'S DISEASE WITHOUT GANGRENE: ICD-10-CM

## 2023-02-28 PROCEDURE — 99214 OFFICE O/P EST MOD 30 MIN: CPT | Performed by: INTERNAL MEDICINE

## 2023-02-28 ASSESSMENT — ENCOUNTER SYMPTOMS
GASTROINTESTINAL NEGATIVE: 1
SHORTNESS OF BREATH: 0
EYE PAIN: 0
EYES NEGATIVE: 1
CONSTIPATION: 0
ABDOMINAL PAIN: 0
TROUBLE SWALLOWING: 0
DIARRHEA: 0
NAUSEA: 0
RESPIRATORY NEGATIVE: 1
COUGH: 0
BACK PAIN: 0
EYE ITCHING: 0

## 2023-02-28 ASSESSMENT — JOINT PAIN
TOTAL NUMBER OF TENDER JOINTS: 0
TOTAL NUMBER OF SWOLLEN JOINTS: 0

## 2023-02-28 NOTE — PROGRESS NOTES
[facility] RHEUMATOLOGY FOLLOW UP NOTE       Date Of Service: 2/28/2023  Provider: Gaudencio Monroy DO, DO  PCP: No primary care provider on file. Name: Edilson Santo   MRN: [id number]        History of Present Illness (HPI)     Chief Complaint   Patient presents with    Follow-up     4 month follow up        Edilson Santo  is a(n)57 y.o. male  here for the f/u evaluation of Rheumatoid Arthritis , Rheumatoid nodulosis , osteoarthritis  of multiple joints, medication monitoring      Nodulosis with drainage along the elbows since December 2022. + tenderness, with direct pressure on the nodules. Fullness reported. Rheumatoid arthritis relatively stable with mild joint pains in the hands. Occasional low back pain. Treated with meloxicam with improvement of symptoms. Denies any morning stiffness or active joint swelling. No rheumatoid arthritis flares reported. REVIEW OF SYSTEMS: (ROS)    Review of Systems   Constitutional: Negative. HENT: Negative. Eyes: Negative. Respiratory: Negative. Cardiovascular: Negative. Gastrointestinal: Negative. Endocrine: Negative. Genitourinary: Negative. Skin: Negative. Neurological: Negative. Hematological: Negative. PmHx:  has a past medical history of Rheumatoid arthritis ([facility] [address] Social History:  reports that he has been smoking cigarettes. He has a 17.50 pack-year smoking history. He has never used smokeless tobacco. He reports that he does not drink alcohol and does not use drugs.     No Known Allergies    CURRENT MEDICATIONS      Current Outpatient Medications:     XELJANZ XR 11 MG TB24, take 1 tablet by mouth once daily, Disp: 30 tablet, Rfl: 11    Blood Pressure Monitoring (BLOOD PRESSURE CUFF) MISC, Monitor blood pressure daily, Disp: 1 each, Rfl: 0    metoprolol succinate (TOPROL XL) 50 MG extended release tablet, Take 1 tablet by mouth daily, Disp: , Rfl:     meloxicam (MOBIC) 7.5 MG tablet, take 1 tablet once daily, Disp: 90 tablet, Rfl: 1    varenicline (CHANTIX) 0.5 MG tablet, Take 1-2 tablets by mouth See Admin Instructions 0.5mg DAILY for 3 days followed by 0.5mg TWICE DAILY for 4 days followed by 1mg TWICE DAILY, Disp: 57 tablet, Rfl: 0    triamcinolone (KENALOG) 0.1 % cream, Apply topically 2 times daily. , Disp: 80 g, Rfl: 1    losartan (COZAAR) 100 MG tablet, , Disp: , Rfl:     NIFEdipine (ADALAT CC) 30 MG extended release tablet, take 1 tablet by mouth once daily, Disp: 30 tablet, Rfl: 5    nicotine (NICODERM CQ) 21 MG/24HR, Place 1 patch onto the skin every 24 hours, Disp: 30 patch, Rfl: 3      PHYSICAL EXAMINATION / OBJECTIVE     Objective:  /80 (Site: Left Upper Arm, Position: Sitting, Cuff Size: Large Adult)   Pulse 67   Ht 5' 6.94\" (1.7 m)   Wt 160 lb 3.2 oz (72.7 kg)   SpO2 97%   BMI 25.14 kg/m²     Physical Exam      General Appearance:  AAO x 3 ,  well-developed and well nourished  Head: NCAT  Eyes: No abnormalities. ,  Sclera non-icteric,   Ears / Nose:  normal  appearance  ears and nose. No active drainage   Mouth:  MMM, ears without deformities  Neck: No jugular venous distention, appears symmetric, good ROM  Lymph:  no adenopathy    Pulmonary/Chest: CTA bilateral ,  symmetric chest expansion. Cardiovascular: Normal S1 and S2, NO murmur, rub, gallop  : Deferred   Abd/GI: Deferred   Neurologic: Speech normal, no facial droop,  Skin:    -- Nodules along the proximal forearms and small erythematous nodules along the bilateral olecranon processes. -- Mild cyanosis of the fingertips bilaterally. Musculoskeletal: No tender or swollen joints appreciated.     Physical Exam        LOWRY-28 (ESR): --  LOWRY-28 (CRP): --            LABS      Lab Results   Component Value Date    WBC 6.8 11/14/2022    HGB 14.6 11/14/2022    HGB 13.7 08/03/2022    HGB 14.2 07/15/2022     11/14/2022    MCHC 32.8 11/14/2022    RDW 12.3 11/14/2022     11/14/2022     08/03/2022     07/15/2022 NEUTROABS 5,300 11/14/2022    LYMPHSABS 900 11/14/2022    LYMPHSABS 800 (L) 08/03/2022    LYMPHSABS 800 (L) 07/15/2022    EOSABS 100 11/14/2022    BASOSABS 0 11/14/2022         Chemistry        Component Value Date/Time     11/14/2022 0000    K 4.9 11/14/2022 0000     11/14/2022 0000    CO2 27 11/14/2022 0000    BUN 11 11/14/2022 0000    CREATININE 0.72 11/14/2022 0000        Component Value Date/Time    CALCIUM 9.1 11/14/2022 0000    ALKPHOS 103 11/14/2022 0000    AST 18 11/14/2022 0000    ALT 17 11/14/2022 0000    BILITOT 1.2 11/14/2022 0000            Lab Results   Component Value Date    SEDRATE 16 08/03/2022    SEDRATE 4 07/15/2022    SEDRATE 16 05/20/2022    CRP 3.0 11/14/2022    CRP 1.5 (H) 08/03/2022    CRP < 0.5 07/15/2022     Lab Results   Component Value Date    RF >10 11/02/2017         RADIOLOGY / PROCEDURES:       ASSESSMENT/PLAN:     1. Rheumatoid arthritis involving multiple sites with positive rheumatoid factor ([facility] [address]    2. Rheumatoid nodulosis (HCC)    3. Raynaud's disease without gangrene    4. Osteoarthritis of both hands, unspecified osteoarthritis type    5. Medication monitoring encounter      Rheumatoid arthritis - remission   Rheumatoid nodulosis- worsened w/ new nodules formation bilateral elbows.    - + RF, IgG, IgM > 100, IgA > 100, nodulosis, + synovitis  - prior tx: plaquenil (not effective), sulfasalazine (weakness and GI upset), arava (LFT elevation), METHOTREXATE 10mg + xeljanz (lymphopenia / leukopenia)                - xeljanz 11 mg daily (aug 2022)                - mobic 7.5 mg daily PRN     Leukopenia  Lymphopenia   --  Leukopenia resolved on November 2022 evaluation mild continued lymphopenia was noted at 900     Bilateral hand osteoarthritis- continue mobic daily PRN     Raynauds- stable- continue procardia 30 mg daily     Nicotine dependence  - discussed risk of tobacco use including COPD, Cancer, CAD               - smoking cessation again discussed with patient Medication monitoring              - CBC, CMP, sed rate, CRP q 12 weeks -- repeat labs now to evaluate for increased inflammation b/c of the new rheum nodulous        Return in about 3 months (around 5/28/2023). New Prescriptions    No medications on file       2/28/2023    Please contact the office if you have any questions or change of symptoms.

## 2023-02-28 NOTE — PROGRESS NOTES
[facility] RHEUMATOLOGY FOLLOW UP NOTE       Date Of Service: 2/28/2023  Provider: Jessica Morales DO PGY-3    Name: Joni Payne   MRN: [id number]    Chief Complaint(s)     Chief Complaint   Patient presents with    Follow-up     4 month follow up        History of Present Illness (HPI)     Joni Payne  is a(n) 59 y.o. male with a hx of rheumatoid arthritis w/ nodulris here for the f/u evaluation of rheumatoid arthritis     Interval hx:    - stopped the xeljanz instead of the methotrexate with increased joint pains on prev visit  - has new painful tender nodules on extensor surface of elbows. Pain worse when putting weight or contact with surface  - still having low back pain  - labs reviewed from 11/22    + joint pains in the knees, feet, back, neck most resolved. Does have low back pain. 3/10. Aggravated by inactivity and improved by activity. Taking Mobic daily    Associated symptoms:  denies swelling/  Redness/ warmth, denies AM stiffness    REVIEW OF SYSTEMS: (ROS)    Review of Systems   Constitutional:  Negative for fatigue, fever and unexpected weight change. HENT:  Negative for congestion and trouble swallowing. Eyes:  Negative for pain and itching. Respiratory:  Negative for cough and shortness of breath. Cardiovascular:  Negative for chest pain and leg swelling. Gastrointestinal:  Negative for abdominal pain, constipation, diarrhea and nausea. Endocrine: Negative for cold intolerance and heat intolerance. Genitourinary:  Negative for difficulty urinating, frequency and urgency. Musculoskeletal:  Negative for arthralgias, back pain and joint swelling. Skin:  Negative for rash. Neurological:  Negative for dizziness, weakness, numbness and headaches. Psychiatric/Behavioral:  The patient is not nervous/anxious.       PAST MEDICAL HISTORY      Past Medical History:   Diagnosis Date    Rheumatoid arthritis ([place] [address]        PAST SURGICAL HISTORY      Past Surgical History:   Procedure Laterality Date    ROTATOR CUFF REPAIR Right     shoulder       FAMILY HISTORY      Family History   Problem Relation Age of Onset    High Blood Pressure Mother     Kidney Disease Father        SOCIAL HISTORY      Social History       Tobacco History       Smoking Status  Current Every Day Smoker Smoking Frequency  0.5 packs/day for 35 years (17.5 pk yrs) Smoking Tobacco Type  Cigarettes      Smokeless Tobacco Use  Never Used              Alcohol History       Alcohol Use Status  No              Drug Use       Drug Use Status  No              Sexual Activity       Sexually Active  Not Asked                    ALLERGIES   No Known Allergies    CURRENT MEDICATIONS      Current Outpatient Medications   Medication Sig Dispense Refill    XELJANZ XR 11 MG TB24 take 1 tablet by mouth once daily 30 tablet 11    Blood Pressure Monitoring (BLOOD PRESSURE CUFF) MISC Monitor blood pressure daily 1 each 0    metoprolol succinate (TOPROL XL) 50 MG extended release tablet Take 1 tablet by mouth daily      meloxicam (MOBIC) 7.5 MG tablet take 1 tablet once daily 90 tablet 1    varenicline (CHANTIX) 0.5 MG tablet Take 1-2 tablets by mouth See Admin Instructions 0.5mg DAILY for 3 days followed by 0.5mg TWICE DAILY for 4 days followed by 1mg TWICE DAILY 57 tablet 0    triamcinolone (KENALOG) 0.1 % cream Apply topically 2 times daily. 80 g 1    losartan (COZAAR) 100 MG tablet       NIFEdipine (ADALAT CC) 30 MG extended release tablet take 1 tablet by mouth once daily 30 tablet 5    nicotine (NICODERM CQ) 21 MG/24HR Place 1 patch onto the skin every 24 hours 30 patch 3     No current facility-administered medications for this visit. PHYSICAL EXAMINATION / OBJECTIVE   Objective:  /80 (Site: Left Upper Arm, Position: Sitting, Cuff Size: Large Adult)   Pulse 67   Ht 5' 6.94\" (1.7 m)   Wt 160 lb 3.2 oz (72.7 kg)   SpO2 97%   BMI 25.14 kg/m²     Physical Exam  Vitals reviewed.    Constitutional:       Appearance: He is well-developed. Cardiovascular:      Rate and Rhythm: Normal rate and regular rhythm. Pulmonary:      Effort: Pulmonary effort is normal.      Breath sounds: Normal breath sounds. Musculoskeletal:      Cervical back: Normal range of motion and neck supple. Skin:     General: Skin is warm and dry. Capillary Refill: Capillary refill takes less than 2 seconds. Findings: Lesion (Open sore on RA nodule on right arm with serous drainage noted) present. No rash. Comments: Bilateral rheumatoid nodules noted on extensor surfaces of elbows   Neurological:      Mental Status: He is alert and oriented to person, place, and time. Psychiatric:         Thought Content:  Thought content normal.       Upper extremities:   Shoulders:  Nontender  Elbows:      Nontender, no swelling  Wrists        Nonender, no swelling  Hands:      Nontender, no swelling    Lower extremities:  Hips:      Nontender  Knees :   Nontender  Ankles:   Nontender  Feet:       Nontender       LABS    CBC  Lab Results   Component Value Date/Time    WBC 6.8 11/14/2022 12:00 AM    RBC 4.29 11/14/2022 12:00 AM    HGB 14.6 11/14/2022 12:00 AM    HCT 44.4 11/14/2022 12:00 AM     11/14/2022 12:00 AM    MCH 34.0 11/14/2022 12:00 AM    MCHC 32.8 11/14/2022 12:00 AM    RDW 12.3 11/14/2022 12:00 AM     11/14/2022 12:00 AM       CMP  Lab Results   Component Value Date/Time    CALCIUM 9.1 11/14/2022 12:00 AM    LABALBU 4.4 11/14/2022 12:00 AM    PROT 6.8 08/03/2022 08:48 AM     11/14/2022 12:00 AM    K 4.9 11/14/2022 12:00 AM    CO2 27 11/14/2022 12:00 AM     11/14/2022 12:00 AM    BUN 11 11/14/2022 12:00 AM    CREATININE 0.72 11/14/2022 12:00 AM    ALKPHOS 103 11/14/2022 12:00 AM    ALT 17 11/14/2022 12:00 AM    AST 18 11/14/2022 12:00 AM       HgBA1c: No components found for: HGBA1C    No results found for: VITD25      No results found for: ANASCRN  No results found for: SSA  No results found for: SSB  No results found for: ANTI-SMITH  No results found for: DSDNAAB   No results found for: ANTIRNP  No results found for: C3, C4  No results found for: CCPAB  Lab Results   Component Value Date    RF >10 11/02/2017       No components found for: CANCASCRN, APANCASCRN  Lab Results   Component Value Date    SEDRATE 16 08/03/2022     Lab Results   Component Value Date    CRP 3.0 11/14/2022       RADIOLOGY:         ASSESSMENT/PLAN    Assessment   Plan     Rheumatoid arthritis  Rheumatoid nodulosis- resolved  - + RF, IgG, IgM > 100, IgA > 100, nodulosis, + synovitis  - prior tx: plaquenil (not effective), sulfasalazine (weakness and GI upset), arava (LFT elevation), methotrexate (leukopenia)   - continue xeljanz 11 mg daily   - mobic 7.5 mg daily PRN  - recheck CBC LFTs, inflammatory markers prior to restarting low dose Arava,     Leukopenia suspect related to MTX    Lymphopenia   - stopped methotrexate   - repeat labs in 4 weeks    Bilateral hand osteoarthritis- continue mobic daily PRN    Raynauds- stable- continue procardia 30 mg daily    Nicotine dependence  - discussed risk of tobacco use including COPD, Cancer, CAD   - smoking cessation again discussed with patient    Medication monitoring   - CBC, CMP, sed rate, CRP q 12 weeks      Return in about 3 months (around 5/28/2023). Electronically signed by Fox Mckeon DO PGY-3 on 2/28/2023 at 9:51 AM    New Prescriptions    No medications on file       Thank you for allowing me to participate in the care of this patient. Please call if there are any questions.

## 2023-03-03 LAB
ALBUMIN SERPL-MCNC: 4.3 G/DL
ALP BLD-CCNC: 102 U/L
ALT SERPL-CCNC: 13 U/L
ANION GAP SERPL CALCULATED.3IONS-SCNC: 14 MMOL/L
AST SERPL-CCNC: 16 U/L
BASOPHILS ABSOLUTE: 0 /ΜL
BASOPHILS RELATIVE PERCENT: 0.4 %
BILIRUB SERPL-MCNC: 1.1 MG/DL (ref 0.1–1.4)
BUN BLDV-MCNC: 12 MG/DL
C-REACTIVE PROTEIN: 3.8
CALCIUM SERPL-MCNC: 9.6 MG/DL
CHLORIDE BLD-SCNC: 106 MMOL/L
CO2: 26 MMOL/L
CREAT SERPL-MCNC: 0.85 MG/DL
EGFR: 60
EOSINOPHILS ABSOLUTE: 100 /ΜL
EOSINOPHILS RELATIVE PERCENT: 0.7 %
GLUCOSE BLD-MCNC: 95 MG/DL
HCT VFR BLD CALC: 45.6 % (ref 41–53)
HEMOGLOBIN: 14.7 G/DL (ref 13.5–17.5)
LYMPHOCYTES ABSOLUTE: 1000 /ΜL
LYMPHOCYTES RELATIVE PERCENT: 15.1 %
MCH RBC QN AUTO: 33.4 PG
MCHC RBC AUTO-ENTMCNC: 32.3 G/DL
MCV RBC AUTO: 103 FL
MONOCYTES ABSOLUTE: 600 /ΜL
MONOCYTES RELATIVE PERCENT: 8.9 %
NEUTROPHILS ABSOLUTE: 5100 /ΜL
NEUTROPHILS RELATIVE PERCENT: 74.9 %
PDW BLD-RTO: 12.8 %
PLATELET # BLD: 248 K/ΜL
PMV BLD AUTO: NORMAL FL
POTASSIUM SERPL-SCNC: 5.3 MMOL/L
RBC # BLD: 4.41 10^6/ΜL
SEDIMENTATION RATE, ERYTHROCYTE: 5
SODIUM BLD-SCNC: 141 MMOL/L
TOTAL PROTEIN: 7.2
WBC # BLD: 6.8 10^3/ML

## 2023-03-07 DIAGNOSIS — M05.79 RHEUMATOID ARTHRITIS INVOLVING MULTIPLE SITES WITH POSITIVE RHEUMATOID FACTOR (HCC): Primary | ICD-10-CM

## 2023-05-16 LAB
ALBUMIN SERPL-MCNC: 4.2 G/DL
ALP BLD-CCNC: 94 U/L
ALT SERPL-CCNC: 11 U/L
ANION GAP SERPL CALCULATED.3IONS-SCNC: NORMAL MMOL/L
AST SERPL-CCNC: 14 U/L
BASOPHILS ABSOLUTE: NORMAL
BASOPHILS RELATIVE PERCENT: 0.3 %
BILIRUB SERPL-MCNC: 1.3 MG/DL (ref 0.1–1.4)
BUN BLDV-MCNC: 8 MG/DL
C-REACTIVE PROTEIN: 3.5
CALCIUM SERPL-MCNC: 9.1 MG/DL
CHLORIDE BLD-SCNC: 104 MMOL/L
CO2: 26 MMOL/L
CREAT SERPL-MCNC: 0.87 MG/DL
EGFR: 60
EOSINOPHILS ABSOLUTE: NORMAL
EOSINOPHILS RELATIVE PERCENT: 1 %
GLUCOSE BLD-MCNC: 106 MG/DL
HCT VFR BLD CALC: 42.3 % (ref 41–53)
HEMOGLOBIN: 14.1 G/DL (ref 13.5–17.5)
LYMPHOCYTES ABSOLUTE: NORMAL
LYMPHOCYTES RELATIVE PERCENT: 17.7 %
MCH RBC QN AUTO: 34.3 PG
MCHC RBC AUTO-ENTMCNC: 33.4 G/DL
MCV RBC AUTO: 103 FL
MONOCYTES ABSOLUTE: NORMAL
MONOCYTES RELATIVE PERCENT: 9.7 %
NEUTROPHILS ABSOLUTE: NORMAL
NEUTROPHILS RELATIVE PERCENT: 71.3 %
PDW BLD-RTO: 12.6 %
PLATELET # BLD: 249 K/ΜL
PMV BLD AUTO: NORMAL FL
POTASSIUM SERPL-SCNC: 4.7 MMOL/L
RBC # BLD: 4.12 10^6/ΜL
SEDIMENTATION RATE, ERYTHROCYTE: 7
SODIUM BLD-SCNC: 135 MMOL/L
TOTAL PROTEIN: 6.8
WBC # BLD: 4.8 10^3/ML

## 2023-05-19 ENCOUNTER — TELEPHONE (OUTPATIENT)
Dept: RHEUMATOLOGY | Age: 58
End: 2023-05-19

## 2023-05-19 NOTE — TELEPHONE ENCOUNTER
----- Message from Cyndi López DO sent at 5/18/2023  1:45 PM EDT -----  Lab testing revealing a low lymphocyte count which can be related to the George mawr. Persist we may need to hold the medication discuss other treatment options. Please have your labs repeated in 6 to 8 weeks. Star Wedge Flap Text: The defect edges were debeveled with a #15 scalpel blade.  Given the location of the defect, shape of the defect and the proximity to free margins a star wedge flap was deemed most appropriate.  Using a sterile surgical marker, an appropriate rotation flap was drawn incorporating the defect and placing the expected incisions within the relaxed skin tension lines where possible. The area thus outlined was incised deep to adipose tissue with a #15 scalpel blade.  The skin margins were undermined to an appropriate distance in all directions utilizing iris scissors.

## 2023-06-27 ENCOUNTER — OFFICE VISIT (OUTPATIENT)
Dept: RHEUMATOLOGY | Age: 58
End: 2023-06-27
Payer: COMMERCIAL

## 2023-06-27 VITALS
DIASTOLIC BLOOD PRESSURE: 78 MMHG | BODY MASS INDEX: 24.04 KG/M2 | SYSTOLIC BLOOD PRESSURE: 122 MMHG | WEIGHT: 153.2 LBS | OXYGEN SATURATION: 98 % | HEIGHT: 67 IN | HEART RATE: 53 BPM

## 2023-06-27 DIAGNOSIS — I73.00 RAYNAUD'S DISEASE WITHOUT GANGRENE: ICD-10-CM

## 2023-06-27 DIAGNOSIS — M19.041 OSTEOARTHRITIS OF BOTH HANDS, UNSPECIFIED OSTEOARTHRITIS TYPE: ICD-10-CM

## 2023-06-27 DIAGNOSIS — F17.219 CIGARETTE NICOTINE DEPENDENCE WITH NICOTINE-INDUCED DISORDER: ICD-10-CM

## 2023-06-27 DIAGNOSIS — M05.79 RHEUMATOID ARTHRITIS INVOLVING MULTIPLE SITES WITH POSITIVE RHEUMATOID FACTOR (HCC): Primary | ICD-10-CM

## 2023-06-27 DIAGNOSIS — D72.810 LYMPHOPENIA: ICD-10-CM

## 2023-06-27 DIAGNOSIS — Z51.81 MEDICATION MONITORING ENCOUNTER: ICD-10-CM

## 2023-06-27 DIAGNOSIS — M19.042 OSTEOARTHRITIS OF BOTH HANDS, UNSPECIFIED OSTEOARTHRITIS TYPE: ICD-10-CM

## 2023-06-27 PROCEDURE — 99214 OFFICE O/P EST MOD 30 MIN: CPT | Performed by: NURSE PRACTITIONER

## 2023-06-27 ASSESSMENT — ENCOUNTER SYMPTOMS
ABDOMINAL PAIN: 0
EYE ITCHING: 0
NAUSEA: 0
EYE PAIN: 0
DIARRHEA: 0
BACK PAIN: 0
TROUBLE SWALLOWING: 0
SHORTNESS OF BREATH: 0
CONSTIPATION: 0
COUGH: 0

## 2023-07-14 LAB
ALBUMIN SERPL-MCNC: 4.3 G/DL
ALP BLD-CCNC: 101 U/L
ALT SERPL-CCNC: 12 U/L
ANION GAP SERPL CALCULATED.3IONS-SCNC: 12 MMOL/L
AST SERPL-CCNC: 15 U/L
BASOPHILS ABSOLUTE: 0 /ΜL
BASOPHILS RELATIVE PERCENT: 0.4 %
BILIRUB SERPL-MCNC: 1.4 MG/DL (ref 0.1–1.4)
BUN BLDV-MCNC: 10 MG/DL
C-REACTIVE PROTEIN: 4.5
CALCIUM SERPL-MCNC: 9 MG/DL
CHLORIDE BLD-SCNC: 105 MMOL/L
CO2: 27 MMOL/L
CREAT SERPL-MCNC: 0.84 MG/DL
EGFR: 60
EOSINOPHILS ABSOLUTE: 0 /ΜL
EOSINOPHILS RELATIVE PERCENT: 0.7 %
GLUCOSE BLD-MCNC: 103 MG/DL
HCT VFR BLD CALC: 42.4 % (ref 41–53)
HEMOGLOBIN: 14.3 G/DL (ref 13.5–17.5)
LYMPHOCYTES ABSOLUTE: 900 /ΜL
LYMPHOCYTES RELATIVE PERCENT: 15.6 %
MCH RBC QN AUTO: 35 PG
MCHC RBC AUTO-ENTMCNC: 33.7 G/DL
MCV RBC AUTO: 104 FL
MONOCYTES ABSOLUTE: NORMAL
MONOCYTES RELATIVE PERCENT: 10.5 %
NEUTROPHILS ABSOLUTE: NORMAL
NEUTROPHILS RELATIVE PERCENT: 72.8 %
PDW BLD-RTO: 13 %
PLATELET # BLD: 242 K/ΜL
PMV BLD AUTO: NORMAL FL
POTASSIUM SERPL-SCNC: 4.6 MMOL/L
RBC # BLD: 4.08 10^6/ΜL
SODIUM BLD-SCNC: 139 MMOL/L
TOTAL PROTEIN: 7.2
WBC # BLD: 5.6 10^3/ML

## 2023-07-18 ENCOUNTER — TELEPHONE (OUTPATIENT)
Dept: RHEUMATOLOGY | Age: 58
End: 2023-07-18

## 2023-07-18 NOTE — TELEPHONE ENCOUNTER
----- Message from GUY Mendosa CNP sent at 7/17/2023  8:17 AM EDT -----  Blood testing with a mildly low lymphocyte count, otherwise no other significant abnormalities. Repeat labs in 12 weeks.

## 2023-09-20 DIAGNOSIS — Z51.81 MEDICATION MONITORING ENCOUNTER: ICD-10-CM

## 2023-09-20 DIAGNOSIS — M05.79 RHEUMATOID ARTHRITIS INVOLVING MULTIPLE SITES WITH POSITIVE RHEUMATOID FACTOR (HCC): ICD-10-CM

## 2023-09-20 LAB
ALBUMIN SERPL-MCNC: 4.3 G/DL
ALP BLD-CCNC: 95 U/L
ALT SERPL-CCNC: 10 U/L
ANION GAP SERPL CALCULATED.3IONS-SCNC: 15 MMOL/L
AST SERPL-CCNC: 15 U/L
BASOPHILS ABSOLUTE: 0 /ΜL
BASOPHILS RELATIVE PERCENT: 0.3 %
BILIRUB SERPL-MCNC: 1.2 MG/DL (ref 0.1–1.4)
BUN BLDV-MCNC: 9 MG/DL
C-REACTIVE PROTEIN: 3
CALCIUM SERPL-MCNC: 9.1 MG/DL
CHLORIDE BLD-SCNC: 105 MMOL/L
CO2: 26 MMOL/L
CREAT SERPL-MCNC: 0.75 MG/DL
EGFR: 60
EOSINOPHILS ABSOLUTE: 100 /ΜL
EOSINOPHILS RELATIVE PERCENT: 1.1 %
GLUCOSE BLD-MCNC: 100 MG/DL
HCT VFR BLD CALC: 45.6 % (ref 41–53)
HEMOGLOBIN: 14.5 G/DL (ref 13.5–17.5)
LYMPHOCYTES ABSOLUTE: 800 /ΜL
LYMPHOCYTES RELATIVE PERCENT: 13.9 %
MCH RBC QN AUTO: 33.5 PG
MCHC RBC AUTO-ENTMCNC: 31.7 G/DL
MCV RBC AUTO: 1.6 FL
MONOCYTES ABSOLUTE: 500 /ΜL
MONOCYTES RELATIVE PERCENT: 9.9 %
NEUTROPHILS ABSOLUTE: 4100 /ΜL
NEUTROPHILS RELATIVE PERCENT: 74.8 %
PDW BLD-RTO: 13.3 %
PLATELET # BLD: 263 K/ΜL
PMV BLD AUTO: NORMAL FL
POTASSIUM SERPL-SCNC: 4.6 MMOL/L
RBC # BLD: 4.31 10^6/ΜL
SEDIMENTATION RATE, ERYTHROCYTE: 5
SODIUM BLD-SCNC: 141 MMOL/L
TOTAL PROTEIN: 7.1
WBC # BLD: 5.5 10^3/ML

## 2023-09-20 RX ORDER — TOFACITINIB 11 MG/1
1 TABLET, FILM COATED, EXTENDED RELEASE ORAL DAILY
Qty: 30 TABLET | Refills: 11 | Status: ACTIVE | OUTPATIENT
Start: 2023-09-20

## 2023-10-03 ENCOUNTER — TELEPHONE (OUTPATIENT)
Dept: RHEUMATOLOGY | Age: 58
End: 2023-10-03

## 2023-10-03 NOTE — TELEPHONE ENCOUNTER
----- Message from Héctor Ni DO sent at 9/27/2023  4:27 PM EDT -----  The labs are stable compared with the prior evaluation. Please continue current medications and have your labs repeated in 12 weeks.

## 2023-10-30 ENCOUNTER — OFFICE VISIT (OUTPATIENT)
Dept: RHEUMATOLOGY | Age: 58
End: 2023-10-30
Payer: COMMERCIAL

## 2023-10-30 VITALS
WEIGHT: 151.4 LBS | HEIGHT: 67 IN | OXYGEN SATURATION: 96 % | DIASTOLIC BLOOD PRESSURE: 68 MMHG | HEART RATE: 51 BPM | SYSTOLIC BLOOD PRESSURE: 134 MMHG | BODY MASS INDEX: 23.76 KG/M2

## 2023-10-30 DIAGNOSIS — M05.79 RHEUMATOID ARTHRITIS INVOLVING MULTIPLE SITES WITH POSITIVE RHEUMATOID FACTOR (HCC): Primary | ICD-10-CM

## 2023-10-30 DIAGNOSIS — Z51.81 MEDICATION MONITORING ENCOUNTER: ICD-10-CM

## 2023-10-30 DIAGNOSIS — M19.041 OSTEOARTHRITIS OF BOTH HANDS, UNSPECIFIED OSTEOARTHRITIS TYPE: ICD-10-CM

## 2023-10-30 DIAGNOSIS — I73.00 RAYNAUD'S DISEASE WITHOUT GANGRENE: ICD-10-CM

## 2023-10-30 DIAGNOSIS — M19.042 OSTEOARTHRITIS OF BOTH HANDS, UNSPECIFIED OSTEOARTHRITIS TYPE: ICD-10-CM

## 2023-10-30 DIAGNOSIS — M54.50 MIDLINE LOW BACK PAIN WITHOUT SCIATICA, UNSPECIFIED CHRONICITY: ICD-10-CM

## 2023-10-30 PROCEDURE — 99214 OFFICE O/P EST MOD 30 MIN: CPT | Performed by: INTERNAL MEDICINE

## 2023-10-30 ASSESSMENT — ENCOUNTER SYMPTOMS
RESPIRATORY NEGATIVE: 1
EYES NEGATIVE: 1
GASTROINTESTINAL NEGATIVE: 1
COLOR CHANGE: 1

## 2023-10-30 NOTE — PROGRESS NOTES
[facility] RHEUMATOLOGY FOLLOW UP NOTE       Date Of Service: 10/30/2023  Provider: Washington Pal DO    Name: Shante Chaidez   MRN: [id number]    Chief Complaint(s)     Chief Complaint   Patient presents with    Follow-up     4 month follow up RA        History of Present Illness (HPI)     Shante Chaidez  is a(n)58 y.o. male with a hx of rheumatoid arthritis w/ nodulosis  here for the f/u evaluation of rheumatoid arthritis     Interval hx:   - Smoking 1/3 ppd  - raynaud type color changes worsened with the cold weather. Low back and neck pain - intermittent pain /flares lasting up to 3 days w/ one occurrence No known trigger. Relief with rest. Denies associated weakness, radicular symptoms, numbness. Rheumatoid arthritis - no flares. Denies joint pains in the hands, wrists, elbow, knee, ankles fleet. No Active nodules bilateral elbow. denies swelling/  Redness/ warmth, denies AM stiffness    REVIEW OF SYSTEMS: (ROS)    Review of Systems   Constitutional: Negative. HENT: Negative. Eyes: Negative. Respiratory: Negative. Cardiovascular: Negative. Gastrointestinal: Negative. Endocrine: Negative. Genitourinary: Negative. Skin:  Positive for color change. Neurological: Negative. Hematological: Negative. PmHx:  has a past medical history of Rheumatoid arthritis ([address] Social History:  reports that he has been smoking cigarettes. He has a 17.50 pack-year smoking history. He has never used smokeless tobacco. He reports that he does not drink alcohol and does not use drugs. No Known Allergies    Current Outpatient Medications   Medication Instructions    Blood Pressure Monitoring (BLOOD PRESSURE CUFF) MISC Monitor blood pressure daily    losartan (COZAAR) 100 MG tablet No dose, route, or frequency recorded.     meloxicam (MOBIC) 7.5 MG tablet take 1 tablet once daily    metoprolol succinate (TOPROL XL) 50 MG extended release tablet 1 tablet, Oral, DAILY    nicotine

## 2023-11-27 LAB — SEDIMENTATION RATE, ERYTHROCYTE: 5

## 2024-02-14 LAB — SEDIMENTATION RATE, ERYTHROCYTE: 6

## 2024-06-03 DIAGNOSIS — Z51.81 MEDICATION MONITORING ENCOUNTER: Primary | ICD-10-CM

## 2024-06-12 LAB
ALBUMIN SERPL-MCNC: 4.2 G/DL
ALP BLD-CCNC: 94 U/L
ALT SERPL-CCNC: 6 U/L
ANION GAP SERPL CALCULATED.3IONS-SCNC: 1.8 MMOL/L
AST SERPL-CCNC: 17 U/L
BASOPHILS ABSOLUTE: 0 /ΜL
BASOPHILS RELATIVE PERCENT: 0.5 %
BILIRUB SERPL-MCNC: 1.4 MG/DL (ref 0.1–1.4)
BUN BLDV-MCNC: 11 MG/DL
C-REACTIVE PROTEIN: 3
CALCIUM SERPL-MCNC: 9.5 MG/DL
CHLORIDE BLD-SCNC: 109 MMOL/L
CO2: 25 MMOL/L
CREAT SERPL-MCNC: 0.96 MG/DL
EGFR: 60
EOSINOPHILS ABSOLUTE: 100 /ΜL
EOSINOPHILS RELATIVE PERCENT: 1 %
GLUCOSE BLD-MCNC: 98 MG/DL
HCT VFR BLD CALC: 41.8 % (ref 41–53)
HEMOGLOBIN: 13.9 G/DL (ref 13.5–17.5)
LYMPHOCYTES ABSOLUTE: 800 /ΜL
LYMPHOCYTES RELATIVE PERCENT: 14.1 %
MCH RBC QN AUTO: 34.4 PG
MCHC RBC AUTO-ENTMCNC: 33.4 G/DL
MCV RBC AUTO: 103 FL
MONOCYTES ABSOLUTE: 500 /ΜL
MONOCYTES RELATIVE PERCENT: 10.2 %
NEUTROPHILS ABSOLUTE: 4000 /ΜL
NEUTROPHILS RELATIVE PERCENT: 74.2 %
PDW BLD-RTO: 12.9 %
PLATELET # BLD: 241 K/ΜL
PMV BLD AUTO: NORMAL FL
POTASSIUM SERPL-SCNC: 4 MMOL/L
RBC # BLD: 4.05 10^6/ΜL
SEDIMENTATION RATE, ERYTHROCYTE: 3
SODIUM BLD-SCNC: 142 MMOL/L
TOTAL PROTEIN: 6.6
WBC # BLD: 5.4 10^3/ML

## 2024-08-08 ENCOUNTER — OFFICE VISIT (OUTPATIENT)
Dept: RHEUMATOLOGY | Age: 59
End: 2024-08-08
Payer: COMMERCIAL

## 2024-08-08 VITALS
HEART RATE: 77 BPM | HEIGHT: 67 IN | OXYGEN SATURATION: 98 % | WEIGHT: 150 LBS | BODY MASS INDEX: 23.54 KG/M2 | SYSTOLIC BLOOD PRESSURE: 138 MMHG | DIASTOLIC BLOOD PRESSURE: 62 MMHG

## 2024-08-08 DIAGNOSIS — M19.041 OSTEOARTHRITIS OF BOTH HANDS, UNSPECIFIED OSTEOARTHRITIS TYPE: ICD-10-CM

## 2024-08-08 DIAGNOSIS — M19.042 OSTEOARTHRITIS OF BOTH HANDS, UNSPECIFIED OSTEOARTHRITIS TYPE: ICD-10-CM

## 2024-08-08 DIAGNOSIS — Z51.81 MEDICATION MONITORING ENCOUNTER: ICD-10-CM

## 2024-08-08 DIAGNOSIS — M05.79 RHEUMATOID ARTHRITIS INVOLVING MULTIPLE SITES WITH POSITIVE RHEUMATOID FACTOR (HCC): Primary | ICD-10-CM

## 2024-08-08 DIAGNOSIS — I73.00 RAYNAUD'S DISEASE WITHOUT GANGRENE: ICD-10-CM

## 2024-08-08 DIAGNOSIS — M54.50 MIDLINE LOW BACK PAIN WITHOUT SCIATICA, UNSPECIFIED CHRONICITY: ICD-10-CM

## 2024-08-08 PROCEDURE — 99214 OFFICE O/P EST MOD 30 MIN: CPT | Performed by: INTERNAL MEDICINE

## 2024-08-08 ASSESSMENT — ENCOUNTER SYMPTOMS
EYES NEGATIVE: 1
GASTROINTESTINAL NEGATIVE: 1
COLOR CHANGE: 1
RESPIRATORY NEGATIVE: 1

## 2024-08-08 ASSESSMENT — JOINT PAIN
TOTAL NUMBER OF SWOLLEN JOINTS: 0
TOTAL NUMBER OF TENDER JOINTS: 2

## 2024-08-08 NOTE — PROGRESS NOTES
[facility] RHEUMATOLOGY FOLLOW UP NOTE       Date Of Service: 8/8/2024  Provider: KENNETH MIN DO    Name: Endy Christian   MRN: [id number]    Chief Complaint(s)     Chief Complaint   Patient presents with    Follow-up     RA follow up           History of Present Illness (HPI)     Endy Christian  is a(n)59 y.o. male with a hx of rheumatoid arthritis w/ nodulosis  here for the f/u evaluation of rheumatoid arthritis     Interval hx:   -- Smoking 1/3 ppd, and THC daily   -- Raynaud type color changes bilateral hands.     -- Rheumatoid Arthritis - no active synovitis - denies morning stiffness.   -- Rheumatoid nodules -- bilateral elbows - increased in size   -- lower back pain - localized -  Relief with rest.-- decreased pain w/ movement, walkign. Denies morning stiffness. Rare virgilio horse in the Right leg.  Denies numbness/tingling in the leg.     REVIEW OF SYSTEMS: (ROS)    Review of Systems   Constitutional: Negative.    HENT: Negative.     Eyes: Negative.    Respiratory: Negative.     Cardiovascular: Negative.    Gastrointestinal: Negative.    Endocrine: Negative.    Genitourinary: Negative.    Skin:  Positive for color change.   Neurological: Negative.    Hematological: Negative.        PmHx:  has a past medical history of Rheumatoid arthritis (HCC).     Social History:  reports that he has been smoking cigarettes. He has a 11.6 pack-year smoking history. He has never used smokeless tobacco. He reports current drug use. Drug: Marijuana (Weed). He reports that he does not drink alcohol.    No Known Allergies    Current Outpatient Medications   Medication Instructions    Blood Pressure Monitoring (BLOOD PRESSURE CUFF) MISC Monitor blood pressure daily    losartan (COZAAR) 100 MG tablet No dose, route, or frequency recorded.    meloxicam (MOBIC) 7.5 MG tablet take 1 tablet once daily    metoprolol succinate (TOPROL XL) 50 MG extended release tablet 1 tablet, Oral, DAILY    nicotine (NICODERM CQ) 21 MG/24HR 1

## 2024-09-13 ENCOUNTER — TELEPHONE (OUTPATIENT)
Dept: RHEUMATOLOGY | Age: 59
End: 2024-09-13

## 2024-09-20 DIAGNOSIS — Z51.81 MEDICATION MONITORING ENCOUNTER: ICD-10-CM

## 2024-09-20 DIAGNOSIS — M05.79 RHEUMATOID ARTHRITIS INVOLVING MULTIPLE SITES WITH POSITIVE RHEUMATOID FACTOR (HCC): ICD-10-CM

## 2024-09-20 RX ORDER — TOFACITINIB 11 MG/1
1 TABLET, FILM COATED, EXTENDED RELEASE ORAL DAILY
Qty: 30 TABLET | Refills: 11 | OUTPATIENT
Start: 2024-09-20

## 2024-09-24 DIAGNOSIS — M05.79 RHEUMATOID ARTHRITIS INVOLVING MULTIPLE SITES WITH POSITIVE RHEUMATOID FACTOR (HCC): ICD-10-CM

## 2024-09-24 DIAGNOSIS — Z51.81 MEDICATION MONITORING ENCOUNTER: ICD-10-CM

## 2024-09-24 RX ORDER — TOFACITINIB 11 MG/1
1 TABLET, FILM COATED, EXTENDED RELEASE ORAL DAILY
Qty: 30 TABLET | Refills: 11 | OUTPATIENT
Start: 2024-09-24

## 2024-10-01 ENCOUNTER — TELEPHONE (OUTPATIENT)
Dept: RHEUMATOLOGY | Age: 59
End: 2024-10-01

## 2024-10-01 LAB
ALBUMIN: 4.5 G/DL
ALP BLD-CCNC: 91 U/L
ALT SERPL-CCNC: 11 U/L
ANION GAP SERPL CALCULATED.3IONS-SCNC: 16 MMOL/L
AST SERPL-CCNC: 21 U/L
BASOPHILS ABSOLUTE: 0 /ΜL
BASOPHILS RELATIVE PERCENT: 0 %
BILIRUB SERPL-MCNC: 1.3 MG/DL (ref 0.1–1.4)
BUN BLDV-MCNC: 10 MG/DL
C-REACTIVE PROTEIN: 3
CALCIUM SERPL-MCNC: 9 MG/DL
CHLORIDE BLD-SCNC: 104 MMOL/L
CO2: 24 MMOL/L
CREAT SERPL-MCNC: 0.83 MG/DL
EOSINOPHILS ABSOLUTE: 0.05 /ΜL
EOSINOPHILS RELATIVE PERCENT: 0.9 %
GFR, ESTIMATED: 60
GLUCOSE BLD-MCNC: 102 MG/DL
HCT VFR BLD CALC: 44.3 % (ref 41–53)
HEMOGLOBIN: 14.4 G/DL (ref 13.5–17.5)
LYMPHOCYTES ABSOLUTE: 0.86 /ΜL
LYMPHOCYTES RELATIVE PERCENT: 14.7 %
MCH RBC QN AUTO: 35.1 PG
MCHC RBC AUTO-ENTMCNC: 32.5 G/DL
MCV RBC AUTO: 108 FL
MONOCYTES ABSOLUTE: 0.62 /ΜL
MONOCYTES RELATIVE PERCENT: 10.6 %
NEUTROPHILS ABSOLUTE: 4.32 /ΜL
NEUTROPHILS RELATIVE PERCENT: 73.8 %
PDW BLD-RTO: 11.6 %
PLATELET # BLD: 240 K/ΜL
PMV BLD AUTO: NORMAL FL
POTASSIUM SERPL-SCNC: 4.9 MMOL/L
RBC # BLD: 4.1 10^6/ΜL
SED RATE, AUTOMATED: 5
SODIUM BLD-SCNC: 139 MMOL/L
TOTAL PROTEIN: 7 G/DL (ref 6.4–8.2)
WBC # BLD: 5.85 10^3/ML

## 2024-10-01 NOTE — TELEPHONE ENCOUNTER
----- Message from GUY Mittal - CNP sent at 10/1/2024  2:58 PM EDT -----  Blood testing with a mildly low lymphocyte count which is stable. Awaiting the results for the liver and kidney function.

## 2024-10-09 ENCOUNTER — TELEPHONE (OUTPATIENT)
Dept: RHEUMATOLOGY | Age: 59
End: 2024-10-09

## 2024-10-09 DIAGNOSIS — Z51.81 MEDICATION MONITORING ENCOUNTER: ICD-10-CM

## 2024-10-09 DIAGNOSIS — M05.79 RHEUMATOID ARTHRITIS INVOLVING MULTIPLE SITES WITH POSITIVE RHEUMATOID FACTOR (HCC): ICD-10-CM

## 2024-10-09 RX ORDER — TOFACITINIB 11 MG/1
1 TABLET, FILM COATED, EXTENDED RELEASE ORAL DAILY
Qty: 30 TABLET | Refills: 11 | Status: ACTIVE | OUTPATIENT
Start: 2024-10-09

## 2024-10-09 NOTE — TELEPHONE ENCOUNTER
DOLV: 8/8/24  DONV: 2/10/25  LAST LAB DRAW: 120/1/24  LAST TB TEST: 10/7/19           Lab Results   Component Value Date      10/01/2024     K 4.9 10/01/2024      10/01/2024     CO2 24 10/01/2024     BUN 10 10/01/2024     CREATININE 0.83 10/01/2024     GLUCOSE 102 10/01/2024     CALCIUM 9.0 10/01/2024     BILITOT 1.3 10/01/2024     ALKPHOS 91 10/01/2024     AST 21 10/01/2024     ALT 11 10/01/2024     LABGLOM 60 10/01/2024     AGRATIO 1.5 08/03/2022             Recent Labs     10/01/24  1437   WBC 5.85   HGB 14.4   HCT 44.3   .0                  Lab Results   Component Value Date     SEDRATE 5 10/01/2024               Lab Results   Component Value Date     CRP 3.0 10/01/2024

## 2024-10-09 NOTE — TELEPHONE ENCOUNTER
----- Message from Jacoby HERNANDEZ sent at 10/9/2024 10:39 AM EDT -----  Regarding: Xeljanz  Hey there,    Pharmacy is requesting a refill - let me know if theres any issues and I can message them directly     THANKS

## 2024-12-19 ENCOUNTER — TELEPHONE (OUTPATIENT)
Age: 59
End: 2024-12-19

## 2024-12-19 DIAGNOSIS — Z51.81 MEDICATION MONITORING ENCOUNTER: Primary | ICD-10-CM

## 2024-12-19 NOTE — TELEPHONE ENCOUNTER
Pt is needing his blood work sent to [facility]. He was going to have blood work done before [place].

## 2025-01-29 LAB
ALBUMIN: 4.3 G/DL
ALP BLD-CCNC: 97 U/L
ALT SERPL-CCNC: 20 U/L
ANION GAP SERPL CALCULATED.3IONS-SCNC: 14 MMOL/L
AST SERPL-CCNC: 22 U/L
BASOPHILS ABSOLUTE: 0.01 /ΜL
BASOPHILS RELATIVE PERCENT: 0.2 %
BILIRUB SERPL-MCNC: 1.3 MG/DL (ref 0.1–1.4)
BUN BLDV-MCNC: 10 MG/DL
C-REACTIVE PROTEIN: 3
CALCIUM SERPL-MCNC: 9.7 MG/DL
CHLORIDE BLD-SCNC: 105 MMOL/L
CO2: 26 MMOL/L
CREAT SERPL-MCNC: 0.94 MG/DL
EOSINOPHILS ABSOLUTE: 0.04 /ΜL
EOSINOPHILS RELATIVE PERCENT: 0.6 %
GFR, ESTIMATED: 60
GLUCOSE BLD-MCNC: 103 MG/DL
HCT VFR BLD CALC: 44.8 % (ref 41–53)
HEMOGLOBIN: 14.5 G/DL (ref 13.5–17.5)
LYMPHOCYTES ABSOLUTE: 0.91 /ΜL
LYMPHOCYTES RELATIVE PERCENT: 14.2 %
MCH RBC QN AUTO: 34.7 PG
MCHC RBC AUTO-ENTMCNC: 32.4 G/DL
MCV RBC AUTO: 107.2 FL
MONOCYTES ABSOLUTE: 0.61 /ΜL
MONOCYTES RELATIVE PERCENT: 9.5 %
NEUTROPHILS ABSOLUTE: 4.85 /ΜL
NEUTROPHILS RELATIVE PERCENT: 75.5 %
PDW BLD-RTO: 11.8 %
PLATELET # BLD: 263 K/ΜL
PMV BLD AUTO: NORMAL FL
POTASSIUM SERPL-SCNC: 4.8 MMOL/L
RBC # BLD: 4.18 10^6/ΜL
SED RATE, AUTOMATED: 4
SODIUM BLD-SCNC: 140 MMOL/L
TOTAL PROTEIN: 7.2 G/DL (ref 6.4–8.2)
WBC # BLD: 6.42 10^3/ML

## 2025-01-30 ENCOUNTER — TELEPHONE (OUTPATIENT)
Age: 60
End: 2025-01-30

## 2025-01-30 NOTE — TELEPHONE ENCOUNTER
----- Message from GUY Mittal CNP sent at 1/30/2025  2:48 PM EST -----  Blood testing with a mildly low lymphocyte count which is stable, otherwise no other significant abnormalities. Repeat labs in 12 weeks.

## 2025-03-04 ENCOUNTER — TELEPHONE (OUTPATIENT)
Age: 60
End: 2025-03-04

## 2025-03-04 NOTE — TELEPHONE ENCOUNTER
----- Message from Jacoby HERNANDEZ sent at 3/3/2025  4:11 PM EST -----  Regarding: KRYSTA Escobedo there,    Reauth with new insurance denied because patients plan wants to have an inadequate response or intolerance to one or more TNF blockers. Would you like me to appeal?    Let me know - THANKS

## 2025-03-12 ENCOUNTER — OFFICE VISIT (OUTPATIENT)
Age: 60
End: 2025-03-12
Payer: MEDICARE

## 2025-03-12 VITALS
SYSTOLIC BLOOD PRESSURE: 140 MMHG | BODY MASS INDEX: 25.05 KG/M2 | WEIGHT: 159.6 LBS | DIASTOLIC BLOOD PRESSURE: 88 MMHG | HEART RATE: 67 BPM | OXYGEN SATURATION: 93 % | HEIGHT: 67 IN

## 2025-03-12 DIAGNOSIS — F17.219 CIGARETTE NICOTINE DEPENDENCE WITH NICOTINE-INDUCED DISORDER: ICD-10-CM

## 2025-03-12 DIAGNOSIS — M05.79 RHEUMATOID ARTHRITIS INVOLVING MULTIPLE SITES WITH POSITIVE RHEUMATOID FACTOR (HCC): Primary | ICD-10-CM

## 2025-03-12 DIAGNOSIS — I73.00 RAYNAUD'S DISEASE WITHOUT GANGRENE: ICD-10-CM

## 2025-03-12 DIAGNOSIS — M19.042 OSTEOARTHRITIS OF BOTH HANDS, UNSPECIFIED OSTEOARTHRITIS TYPE: ICD-10-CM

## 2025-03-12 DIAGNOSIS — M19.041 OSTEOARTHRITIS OF BOTH HANDS, UNSPECIFIED OSTEOARTHRITIS TYPE: ICD-10-CM

## 2025-03-12 DIAGNOSIS — Z51.81 MEDICATION MONITORING ENCOUNTER: ICD-10-CM

## 2025-03-12 PROCEDURE — 4004F PT TOBACCO SCREEN RCVD TLK: CPT | Performed by: NURSE PRACTITIONER

## 2025-03-12 PROCEDURE — G2211 COMPLEX E/M VISIT ADD ON: HCPCS | Performed by: NURSE PRACTITIONER

## 2025-03-12 PROCEDURE — 99214 OFFICE O/P EST MOD 30 MIN: CPT | Performed by: NURSE PRACTITIONER

## 2025-03-12 PROCEDURE — G8419 CALC BMI OUT NRM PARAM NOF/U: HCPCS | Performed by: NURSE PRACTITIONER

## 2025-03-12 PROCEDURE — G8427 DOCREV CUR MEDS BY ELIG CLIN: HCPCS | Performed by: NURSE PRACTITIONER

## 2025-03-12 PROCEDURE — 3017F COLORECTAL CA SCREEN DOC REV: CPT | Performed by: NURSE PRACTITIONER

## 2025-03-12 RX ORDER — ADHESIVE BANDAGE 3/4"
BANDAGE TOPICAL
Qty: 1 EACH | Refills: 0 | Status: SHIPPED | OUTPATIENT
Start: 2025-03-12

## 2025-03-12 RX ORDER — LOSARTAN POTASSIUM 50 MG/1
100 TABLET ORAL DAILY
COMMUNITY
Start: 2024-12-11

## 2025-03-12 ASSESSMENT — ENCOUNTER SYMPTOMS
EYES NEGATIVE: 1
GASTROINTESTINAL NEGATIVE: 1
RESPIRATORY NEGATIVE: 1

## 2025-03-12 NOTE — PROGRESS NOTES
[facility] RHEUMATOLOGY FOLLOW UP NOTE       Date Of Service: 3/12/2025  Provider: Pearl Pradhan, APRN - CNP    Name: Endy Christian   MRN: [id number]    Chief Complaint(s)     Chief Complaint   Patient presents with    Follow-up     6-month f/u for RA.         History of Present Illness (HPI)     Endy Christian  is a(n)60 y.o. male with a hx of rheumatoid arthritis w/ nodulris here for the f/u evaluation of rheumatoid arthritis     Interval hx:     Patient denies any current joint pains, joint swelling, or morning stiffness. No flares since last evaluation.     Low back pain has resolved with home exercises.     Rheumatoid nodules are stable.     REVIEW OF SYSTEMS: (ROS)    Review of Systems   Constitutional: Negative.    HENT: Negative.     Eyes: Negative.    Respiratory: Negative.     Cardiovascular: Negative.    Gastrointestinal: Negative.    Endocrine: Negative.    Genitourinary: Negative.    Musculoskeletal: Negative.    Skin: Negative.    Neurological: Negative.    Psychiatric/Behavioral: Negative.         PAST MEDICAL HISTORY      Past Medical History:   Diagnosis Date    Rheumatoid arthritis (HCC)        PAST SURGICAL HISTORY      Past Surgical History:   Procedure Laterality Date    ROTATOR CUFF REPAIR Right     shoulder       FAMILY HISTORY      Family History   Problem Relation Age of Onset    High Blood Pressure Mother     Kidney Disease Father        SOCIAL HISTORY      Social History       Tobacco History       Smoking Status  Current Every Day Smoker Smoking Frequency  0.5 packs/day for 35 years (17.5 pk yrs) Smoking Tobacco Type  Cigarettes      Smokeless Tobacco Use  Never Used              Alcohol History       Alcohol Use Status  No              Drug Use       Drug Use Status  No              Sexual Activity       Sexually Active  Not Asked                    ALLERGIES   No Known Allergies    CURRENT MEDICATIONS      Current Outpatient Medications   Medication Sig Dispense Refill

## 2025-03-20 DIAGNOSIS — M05.79 RHEUMATOID ARTHRITIS INVOLVING MULTIPLE SITES WITH POSITIVE RHEUMATOID FACTOR (HCC): ICD-10-CM

## 2025-03-20 DIAGNOSIS — Z51.81 MEDICATION MONITORING ENCOUNTER: ICD-10-CM

## 2025-03-20 RX ORDER — TOFACITINIB 11 MG/1
1 TABLET, FILM COATED, EXTENDED RELEASE ORAL DAILY
Qty: 30 TABLET | Refills: 11 | Status: ACTIVE | OUTPATIENT
Start: 2025-03-20

## 2025-03-20 NOTE — TELEPHONE ENCOUNTER
Received a fax from [facility] regarding refills for Xeljanz. In patient chart, it shows Endy is filling with [facility]. I spoke with Endy regarding this matter. He stated that his insurance wouldn't cover the medication and he has decided to fill with [facility]. Jacoby could you please cancel any remaining fills through [facility]. Patient preferred pharmacy is [facility].     DOLV: 3/12/25  DONV: 9/17/25

## 2025-04-04 LAB
ALBUMIN: 4.2 G/DL
ALP BLD-CCNC: 107 U/L
ALT SERPL-CCNC: 16 U/L
ANION GAP SERPL CALCULATED.3IONS-SCNC: 14 MMOL/L
AST SERPL-CCNC: 20 U/L
BASOPHILS ABSOLUTE: 0.02 /ΜL
BASOPHILS RELATIVE PERCENT: 0.4 %
BILIRUB SERPL-MCNC: 1.2 MG/DL (ref 0.1–1.4)
BUN BLDV-MCNC: 11 MG/DL
C-REACTIVE PROTEIN: 3
CALCIUM SERPL-MCNC: 9 MG/DL
CHLORIDE BLD-SCNC: 105 MMOL/L
CO2: 24 MMOL/L
CREAT SERPL-MCNC: 0.84 MG/DL
EOSINOPHILS ABSOLUTE: 0.08 /ΜL
EOSINOPHILS RELATIVE PERCENT: 1.5 %
GFR, ESTIMATED: 60
GLUCOSE BLD-MCNC: 98 MG/DL
HCT VFR BLD CALC: 43.7 % (ref 41–53)
HEMOGLOBIN: 14.4 G/DL (ref 13.5–17.5)
LYMPHOCYTES ABSOLUTE: 1 /ΜL
LYMPHOCYTES RELATIVE PERCENT: 18.5 %
MCH RBC QN AUTO: 34.6 PG
MCHC RBC AUTO-ENTMCNC: 33 G/DL
MCV RBC AUTO: 105 FL
MONOCYTES ABSOLUTE: 0.56 /ΜL
MONOCYTES RELATIVE PERCENT: 10.3 %
NEUTROPHILS ABSOLUTE: 3.75 /ΜL
NEUTROPHILS RELATIVE PERCENT: 69.1 %
PDW BLD-RTO: 11.5 %
PLATELET # BLD: 245 K/ΜL
PMV BLD AUTO: NORMAL FL
POTASSIUM SERPL-SCNC: 4.3 MMOL/L
RBC # BLD: 4.16 10^6/ΜL
SED RATE, AUTOMATED: 6
SODIUM BLD-SCNC: 139 MMOL/L
TOTAL PROTEIN: 6.9 G/DL (ref 6.4–8.2)
WBC # BLD: 5.42 10^3/ML

## 2025-04-07 ENCOUNTER — RESULTS FOLLOW-UP (OUTPATIENT)
Age: 60
End: 2025-04-07

## 2025-04-07 DIAGNOSIS — M05.79 RHEUMATOID ARTHRITIS INVOLVING MULTIPLE SITES WITH POSITIVE RHEUMATOID FACTOR (HCC): ICD-10-CM

## 2025-04-07 DIAGNOSIS — Z51.81 MEDICATION MONITORING ENCOUNTER: ICD-10-CM

## 2025-04-07 RX ORDER — TOFACITINIB 11 MG/1
1 TABLET, FILM COATED, EXTENDED RELEASE ORAL DAILY
Qty: 90 TABLET | Refills: 0 | Status: ACTIVE | OUTPATIENT
Start: 2025-04-07

## 2025-05-22 LAB
ALBUMIN: 4.3 G/DL
ALP BLD-CCNC: 92 U/L
ALT SERPL-CCNC: 15 U/L
ANION GAP SERPL CALCULATED.3IONS-SCNC: NORMAL MMOL/L
AST SERPL-CCNC: 19 U/L
BASOPHILS ABSOLUTE: 0.02 /ΜL
BASOPHILS RELATIVE PERCENT: 0.4 %
BILIRUB SERPL-MCNC: 1.1 MG/DL (ref 0.1–1.4)
BUN BLDV-MCNC: 10 MG/DL
C-REACTIVE PROTEIN: 3
CALCIUM SERPL-MCNC: 9.3 MG/DL
CHLORIDE BLD-SCNC: 107 MMOL/L
CO2: 24 MMOL/L
CREAT SERPL-MCNC: 0.83 MG/DL
EOSINOPHILS ABSOLUTE: 0.05 /ΜL
EOSINOPHILS RELATIVE PERCENT: 0.9 %
GFR, ESTIMATED: 60
GLUCOSE BLD-MCNC: 94 MG/DL
HCT VFR BLD CALC: 42.9 % (ref 41–53)
HEMOGLOBIN: 14 G/DL (ref 13.5–17.5)
LYMPHOCYTES ABSOLUTE: 0.94 /ΜL
LYMPHOCYTES RELATIVE PERCENT: 17.5 %
MCH RBC QN AUTO: 34.1 PG
MCHC RBC AUTO-ENTMCNC: 32.6 G/DL
MCV RBC AUTO: 104.6 FL
MONOCYTES ABSOLUTE: 0.57 /ΜL
MONOCYTES RELATIVE PERCENT: 10.6 %
NEUTROPHILS ABSOLUTE: 3.8 /ΜL
NEUTROPHILS RELATIVE PERCENT: 70.6 %
PDW BLD-RTO: 11.3 %
PLATELET # BLD: 231 K/ΜL
PMV BLD AUTO: 70.6 FL
POTASSIUM SERPL-SCNC: 4.6 MMOL/L
RBC # BLD: 4.1 10^6/ΜL
SED RATE, AUTOMATED: 5
SODIUM BLD-SCNC: 139 MMOL/L
TOTAL PROTEIN: 6.7 G/DL (ref 6.4–8.2)
WBC # BLD: 5.38 10^3/ML

## 2025-05-26 ENCOUNTER — RESULTS FOLLOW-UP (OUTPATIENT)
Age: 60
End: 2025-05-26

## 2025-07-07 DIAGNOSIS — Z51.81 MEDICATION MONITORING ENCOUNTER: ICD-10-CM

## 2025-07-07 DIAGNOSIS — M05.79 RHEUMATOID ARTHRITIS INVOLVING MULTIPLE SITES WITH POSITIVE RHEUMATOID FACTOR (HCC): ICD-10-CM

## 2025-07-07 RX ORDER — TOFACITINIB 11 MG/1
1 TABLET, FILM COATED, EXTENDED RELEASE ORAL DAILY
Qty: 30 TABLET | Refills: 0 | Status: ACTIVE | OUTPATIENT
Start: 2025-07-07

## 2025-07-07 NOTE — TELEPHONE ENCOUNTER
DOLV: 03/12/25  DONV: 09/17/25  LAST LAB DRAW: 05/22/25  LAST TB TEST: 10/07/2019    Lab Results   Component Value Date     05/22/2025    K 4.6 05/22/2025     05/22/2025    CO2 24 05/22/2025    BUN 10 05/22/2025    CREATININE 0.83 05/22/2025    GLUCOSE 94 05/22/2025    CALCIUM 9.3 05/22/2025    BILITOT 1.1 05/22/2025    ALKPHOS 92 05/22/2025    AST 19 05/22/2025    ALT 15 05/22/2025    LABGLOM 60 05/22/2025    AGRATIO 1.5 08/03/2022       No results for input(s): \"WBC\", \"HGB\", \"HCT\", \"MCV\", \"PLT\" in the last 720 hours.    Lab Results   Component Value Date    SEDRATE 5 05/22/2025       Lab Results   Component Value Date    CRP 3.0 05/22/2025

## 2025-07-28 DIAGNOSIS — M05.79 RHEUMATOID ARTHRITIS INVOLVING MULTIPLE SITES WITH POSITIVE RHEUMATOID FACTOR (HCC): ICD-10-CM

## 2025-07-28 DIAGNOSIS — Z51.81 MEDICATION MONITORING ENCOUNTER: ICD-10-CM

## 2025-07-28 RX ORDER — TOFACITINIB 11 MG/1
1 TABLET, FILM COATED, EXTENDED RELEASE ORAL DAILY
Qty: 30 TABLET | Refills: 0 | Status: ACTIVE | OUTPATIENT
Start: 2025-07-28

## 2025-07-28 NOTE — TELEPHONE ENCOUNTER
DOLV: 3/12/25  DONV: 9/17/25  LAST LAB DRAW: 5/22/25  LAST TB TEST: 8/8/24    Lab Results   Component Value Date     05/22/2025    K 4.6 05/22/2025     05/22/2025    CO2 24 05/22/2025    BUN 10 05/22/2025    CREATININE 0.83 05/22/2025    GLUCOSE 94 05/22/2025    CALCIUM 9.3 05/22/2025    BILITOT 1.1 05/22/2025    ALKPHOS 92 05/22/2025    AST 19 05/22/2025    ALT 15 05/22/2025    LABGLOM 60 05/22/2025    AGRATIO 1.5 08/03/2022       No results for input(s): \"WBC\", \"HGB\", \"HCT\", \"MCV\", \"PLT\" in the last 720 hours.    Lab Results   Component Value Date    SEDRATE 5 05/22/2025       Lab Results   Component Value Date    CRP 3.0 05/22/2025